# Patient Record
Sex: FEMALE | Race: WHITE | Employment: FULL TIME | URBAN - METROPOLITAN AREA
[De-identification: names, ages, dates, MRNs, and addresses within clinical notes are randomized per-mention and may not be internally consistent; named-entity substitution may affect disease eponyms.]

---

## 2018-12-26 ENCOUNTER — APPOINTMENT (OUTPATIENT)
Dept: PHYSICAL THERAPY | Age: 48
End: 2018-12-26

## 2019-01-10 ENCOUNTER — HOSPITAL ENCOUNTER (OUTPATIENT)
Dept: PHYSICAL THERAPY | Age: 49
Discharge: HOME | End: 2019-01-10
Payer: COMMERCIAL

## 2019-01-10 PROCEDURE — 97161 PT EVAL LOW COMPLEX 20 MIN: CPT | Performed by: PHYSICAL THERAPIST

## 2019-01-11 NOTE — PROGRESS NOTES
Laure Cardenas PHYSICAL THERAPY - DAILY TREATMENT NOTE    Patient Name: Yayo Andre        Date: 2019  : 1970   yes Patient  Verified  Visit #:   1     Insurance: Payor: Samantha Cowan / Plan: [address] PT / Product Type: Commerical /      In time: 600 Out time: 640   Total Treatment Time: 40     Medicare/[facility] Time Tracking (below)   Total Timed Codes (min):  na 1:1 Treatment Time:  na     TREATMENT AREA =  Lumbar spine pain [M54.5]    SUBJECTIVE  Pain Level (on 0 to 10 scale):  3  / 10   Medication Changes/New allergies or changes in medical history, any new surgeries or procedures?    no  If yes, update Summary List   Subjective Functional Status/Changes:  []  No changes reported     See ie          OBJECTIVE        Billed With/As:   [] TE   [] TA   [] Neuro   [] Self Care Patient Education: [x] Review HEP    [] Progressed/Changed HEP based on:   [x] positioning   [x] body mechanics   [x] transfers   [x] heat/ice application    [] other:      Other Objective/Functional Measures:    Reviewed doni exercise progression including standing for ease of completion at work, given sitting/sleeping position education as well as transfer mechanics. Demo understanding to all   See ie     Post Treatment Pain Level (on 0 to 10) scale:   3  / 10     ASSESSMENT  Assessment/Changes in Function:     See ie     []  See Progress Note/Recertification   Patient will continue to benefit from skilled PT services to modify and progress therapeutic interventions, address functional mobility deficits, address ROM deficits, address strength deficits, analyze and address soft tissue restrictions, analyze and cue movement patterns, analyze and modify body mechanics/ergonomics, assess and modify postural abnormalities and instruct in home and community integration to attain remaining goals.    Progress toward goals / Updated goals:    See ie     PLAN  []  Upgrade activities as tolerated yes Continue plan of care   [] Discharge due to :    []  Other:      Therapist: Raghu Brock PT    Date: 1/10/19 Time: 7:40 AM     Future Appointments   Date Time Provider Geraldine Burrell   1/15/2019  5:00 PM Todd Holland, PT [facility]   1/17/2019  2:00 PM [facility]   1/18/2019 10:30 AM Horace Joy, PT [facility]   1/21/2019  8:00 AM Todd Holland, PT [facility]   1/24/2019  4:30 PM [facility]   1/29/2019 11:00 AM Todd Holland, PT [facility]   1/30/2019  9:30 AM Todd Holland, PT [facility]   2/5/2019  5:00 PM Todd Holland, PT [facility]   2/7/2019  9:00 AM Hallie Woodson, PT [facility]

## 2019-01-11 NOTE — PROGRESS NOTES
.[address] THERAPY  [address] [place] [address] 51, Ravindra Hanh [address],[facility], [facility] - Phone: [phone]  Fax: [phone] OF CARE / [facility]  Patient Name: Nhan Ambrosio : 1970   Medical   Diagnosis: LBP Treatment Diagnosis: Lumbar spine pain [M54.5]   Onset Date: 2018     Referral Source: Luisa Saucedo DO Start of Care [facility]): 2019   Prior Hospitalization: See medical history Provider #: [id number]   Prior Level of Function: full   Comorbidities: none   Medications: Verified on Patient Summary List   The Plan of Care and following information is based on the information from the initial evaluation.   ===========================================================================================  Assessment / key information:  57 F arrives to clinic with c/o low back pain radiating to L buttock and lateral thigh. Reports original onset in 2018 with no DOMENICO and intermittent that has progressed to constant sx. 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 Previous treatment has including prednisone, LEISA, one PT and chiropractic treatment, Lidocaine patch and tens unit. At max provided 20% relief. 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 Rates pain 9/10 at worst with prolonged sitting and bending and 3/10 in prone position. Objective findings: postural assessment demo R trunk SB, L ant innominate and anterior ilial translation, l/s arom limited flex 75% with minimal curve reversal P!, L rotation 90% loss with t/s compensation and buttock pain, B sb 50%, et 25% with REIL and VLADIMIR reducing sx, +SLR on L as well as SI Cluster testing on L. Reduced l3 myotome on L. Poor glute max/med strength. ttp and increase tightness throughout paraspinals, L gm/pir and psoas, R RF/TFL.  Will attempt PT including doni approach (based on pt response) in order to address problem list below  ===========================================================================================  Eval Complexity: History MEDIUM  Complexity : 1-2 comorbidities / personal factors will impact the outcome/ POC ;  Examination  HIGH Complexity : 4+ Standardized tests and measures addressing body structure, function, activity limitation and / or participation in recreation ; Presentation LOW Complexity : Stable, uncomplicated ;  Decision Making MEDIUM Complexity : FOTO score of 26-74; Overall Complexity LOW   Problem List: pain affecting function, decrease ROM, decrease strength, impaired gait/ balance, decrease ADL/ functional abilitiies, decrease activity tolerance, decrease flexibility/ joint mobility and decrease transfer abilities   Treatment Plan may include any combination of the following: Therapeutic exercise, Therapeutic activities, Neuromuscular re-education, Physical agent/modality, Gait/balance training, Manual therapy, Patient education, Self Care training and Functional mobility training  Patient / Family readiness to learn indicated by: asking questions, trying to perform skills and interest  Persons(s) to be included in education: patient (P)  Barriers to Learning/Limitations: None  Measures taken, if barriers to learning:    Patient Goal (s): Decrease pain, return to prior level of function   Patient self reported health status: good  Rehabilitation Potential: good   Short Term Goals: To be accomplished in  2  weeks:  1. Pt will be compliant with hep  2. Pt will demonstrate ability to sit<>stand with appropriate hip hinge to improve lumbar stabilization during transfers  3. Pt will note daily max pain </=7/10 in order to increase participation in [address]: To be accomplished in  4  weeks:  1. Pt will increase LS arom within 25% in all direction to A with dressing  2. Pt will note daily max pain </=4/10 in order to increase participation in adls  3.  Pt will increase FOTO by 31 points in order to show functional improvement  Frequency / Duration:   Patient to be seen  2-3  times per week for 4  weeks:  Patient / Caregiver education and instruction: self care, activity modification and exercises  Therapist Signature: Panda Prince DPT [facility] Date: 7/71/1631   Certification Period: na Time: 7:43 AM   ===========================================================================================  I certify that the above Physical Therapy Services are being furnished while the patient is under my care. I agree with the treatment plan and certify that this therapy is necessary. Physician Signature:        Date:       Time:     Please sign and return to [facility]. or you may fax the signed copy to [phone]. Thank you.

## 2019-01-15 ENCOUNTER — HOSPITAL ENCOUNTER (OUTPATIENT)
Dept: PHYSICAL THERAPY | Age: 49
Discharge: HOME | End: 2019-01-15
Payer: COMMERCIAL

## 2019-01-15 PROCEDURE — 97140 MANUAL THERAPY 1/> REGIONS: CPT | Performed by: PHYSICAL THERAPIST

## 2019-01-15 PROCEDURE — 97110 THERAPEUTIC EXERCISES: CPT | Performed by: PHYSICAL THERAPIST

## 2019-01-15 NOTE — PROGRESS NOTES
Radha Long PHYSICAL THERAPY - DAILY TREATMENT NOTE    Patient Name: Sharonda Lorenzo        Date: 1/15/2019  : 1970   yes Patient  Verified  Visit #:   2     Insurance: Payor: Woody Rather / Plan: [address] / Product Type: Commerical /      In time: 450 Out time: 548   Total Treatment Time: 55     Medicare/[employer] Time Tracking (below)   Total Timed Codes (min):  na 1:1 Treatment Time:  na     TREATMENT AREA =  Lumbar spine pain [M54.5]    SUBJECTIVE  Pain Level (on 0 to 10 scale):  5  / 10   Medication Changes/New allergies or changes in medical history, any new surgeries or procedures?    no  If yes, update Summary List   Subjective Functional Status/Changes:  []  No changes reported     I have been doing the exercises as much as possible.  I thought it was getting a little better but then it just goes back out again           OBJECTIVE  Modalities Rationale:     decrease inflammation, decrease pain and increase tissue extensibility to improve patient's ability to complete adls   min [] Estim, type/location:                                      []  att     []  unatt     []  w/US     []  w/ice    []  w/heat    min []  Mechanical Traction: type/lbs                   []  pro   []  sup   []  int   []  cont    []  before manual    []  after manual    min []  Ultrasound, settings/location:      min []  Iontophoresis w/ dexamethasone, location:                                               []  take home patch       []  in clinic   10 min [x]  Ice     []  Heat    location/position:     min []  Vasopneumatic Device, press/temp:     min []  Other:    [x] Skin assessment post-treatment (if applicable):    []  intact    [x]  redness- no adverse reaction     []redness - adverse reaction:        20 min Therapeutic Exercise:  [x]  See flow sheet   Rationale:      increase ROM and increase strength to improve the patients ability to complete adls     25 min Manual Therapy: Met for L on L, angelina tech, [facility] ls paraspinals, t/s paraspinals, L psoas release   Rationale:      decrease pain, increase ROM, increase tissue extensibility and decrease trigger points to improve patient's ability to complete adls          Billed With/As:   [x] TE   [] TA   [] Neuro   [] Self Care Patient Education: [x] Review HEP    [] Progressed/Changed HEP based on:   [x] positioning   [x] body mechanics   [x] transfers   [x] heat/ice application    [] other:      Other Objective/Functional Measures:    Significant tenderness throughout L ls and R ts paraspinals, L psoas with reduced pa mobility throughout  Unable to lay on L side to perform sidelying exercises due to pain     Post Treatment Pain Level (on 0 to 10) scale:   3  / 10     ASSESSMENT  Assessment/Changes in Function:     Noted reduction in pain following session but pt was advised on DOMS      []  See Progress Note/Recertification   Patient will continue to benefit from skilled PT services to modify and progress therapeutic interventions, address functional mobility deficits, address ROM deficits, address strength deficits, analyze and address soft tissue restrictions, analyze and cue movement patterns, analyze and modify body mechanics/ergonomics, assess and modify postural abnormalities and instruct in home and community integration to attain remaining goals.    Progress toward goals / Updated goals:    Compliant with hep     PLAN  []  Upgrade activities as tolerated yes Continue plan of care   []  Discharge due to :    []  Other:      Therapist: Beth Yanes PT    Date: 1/15/2019 Time: 10:17 AM     Future Appointments   Date Time Provider Geraldine Burrell   1/15/2019  5:00 PM Adrienne Shaffer PT [facility]   [facility]17[facility]   1/18/2019 10:30 AM Javier Hernandez PT [facility]   1/21/2019  8:00 AM Adrienne Shaffer, PT [facility]   1/24/2019  4:30 [facility]   1/29/2019 11:00 AM Adrienne Shaffer PT [facility]   1/30/2019 9:30 AM Ernestine Garcia [facility]   2/5/2019  5:00 PM [facility]   2/7/2019  9:00 AM Marcelo Woodson, PT [facility]

## 2019-01-17 ENCOUNTER — HOSPITAL ENCOUNTER (OUTPATIENT)
Dept: PHYSICAL THERAPY | Age: 49
Discharge: HOME | End: 2019-01-17
Payer: COMMERCIAL

## 2019-01-17 PROCEDURE — 97110 THERAPEUTIC EXERCISES: CPT | Performed by: PHYSICAL THERAPIST

## 2019-01-17 PROCEDURE — 97140 MANUAL THERAPY 1/> REGIONS: CPT | Performed by: PHYSICAL THERAPIST

## 2019-01-17 NOTE — PROGRESS NOTES
Beatrice Brenner PHYSICAL THERAPY - DAILY TREATMENT NOTE    Patient Name: Christine Franklin        Date: 2019  : 1970   yes Patient  Verified  Visit #:   3     Insurance: Payor: Reyes Daunt / Plan: [address] PT / Product Type: Commerical /      In time: 211 Out time: 320   Total Treatment Time: 65     Medicare/[facility] Time Tracking (below)   Total Timed Codes (min):  na 1:1 Treatment Time:  na     TREATMENT AREA =  Lumbar spine pain [M54.5]    SUBJECTIVE  Pain Level (on 0 to 10 scale):  5  / 10   Medication Changes/New allergies or changes in medical history, any new surgeries or procedures?    no  If yes, update Summary List   Subjective Functional Status/Changes:  []  No changes reported     I was really sore that night but yesterday I woke up and felt pretty good.  I worked a 16 hour shift so I did the backbend as much as I could          OBJECTIVE  Modalities Rationale:     decrease inflammation, decrease pain and increase tissue extensibility to improve patient's ability to complete adls   min [] Estim, type/location:                                      []  att     []  unatt     []  w/US     []  w/ice    []  w/heat    min []  Mechanical Traction: type/lbs                   []  pro   []  sup   []  int   []  cont    []  before manual    []  after manual    min []  Ultrasound, settings/location:      min []  Iontophoresis w/ dexamethasone, location:                                               []  take home patch       []  in clinic   10 min [x]  Ice     []  Heat    location/position:     min []  Vasopneumatic Device, press/temp:     min []  Other:    [x] Skin assessment post-treatment (if applicable):    []  intact    [x]  redness- no adverse reaction     []redness - adverse reaction:        20 min Therapeutic Exercise:  [x]  See flow sheet   Rationale:      increase ROM and increase strength to improve the patients ability to complete adls     35 min Manual Therapy: Met for L on L, shotgun tech, stm ls paraspinals, t/s paraspinals, L psoas and glute release, grade I-ii pa mobs l3-5, sacral rr/flex mobs    Rationale:      decrease pain, increase ROM, increase tissue extensibility and decrease trigger points to improve patient's ability to complete adls          Billed With/As:   [x] TE   [] TA   [] Neuro   [] Self Care Patient Education: [x] Review HEP    [] Progressed/Changed HEP based on:   [x] positioning   [x] body mechanics   [x] transfers   [x] heat/ice application    [] other:      Other Objective/Functional Measures:  Due to reduction of pain level and improved positional tolerance performed similar program from 2nd visit  Reduced mm restrictions along paraspinals and reproduced lateral hip c/o with gm release  Required increased time to perform listed te with good form so held on remaining due to time constraint     Post Treatment Pain Level (on 0 to 10) scale:   2/ 10     ASSESSMENT  Assessment/Changes in Function:   Continues with s/s consistent with posterior derangement followed by sij dysfunction       []  See Progress Note/Recertification   Patient will continue to benefit from skilled PT services to modify and progress therapeutic interventions, address functional mobility deficits, address ROM deficits, address strength deficits, analyze and address soft tissue restrictions, analyze and cue movement patterns, analyze and modify body mechanics/ergonomics, assess and modify postural abnormalities and instruct in home and community integration to attain remaining goals.    Progress toward goals / Updated goals:    Progress towards goal     PLAN  []  Upgrade activities as tolerated yes Continue plan of care   []  Discharge due to :    []  Other:      Therapist: Brionna Stevens, PT    Date: 1/17/2019 Time: 10:17 AM     Future Appointments   Date Time Provider Geraldine Burrell   1/17/2019  2:00 PM Manish Valera [facility]   1/18/2019 10:30 AM Chelly Lott, PT [facility] 1/21/2019  8:00 AM Julieta Maciel, PT [facility]   1/24/2019  4:30 PM Anca Castro [facility]   [facility]/29/2019 [facility]   1/30/2019  9:30 AM Julieta Maciel, PT [facility]   2/5/2019  5:00 PM Julieta Maciel, PT [facility]   2/7/2019  9:00 AM Julio Woodson, PT [facility]

## 2019-01-18 ENCOUNTER — HOSPITAL ENCOUNTER (OUTPATIENT)
Dept: PHYSICAL THERAPY | Age: 49
Discharge: HOME | End: 2019-01-18
Payer: COMMERCIAL

## 2019-01-18 PROCEDURE — 97110 THERAPEUTIC EXERCISES: CPT

## 2019-01-18 PROCEDURE — 97140 MANUAL THERAPY 1/> REGIONS: CPT

## 2019-01-18 NOTE — PROGRESS NOTES
PHYSICAL THERAPY - DAILY TREATMENT NOTE    Patient Name: Emilio Enriquez        Date: 2019  : 1970   yes Patient  Verified  Visit #:   4     Insurance: Payor: Yenni Lindsay / Plan: [address] PT / Product Type: Commerical /      In time: 1041 Out time: 1150   Total Treatment Time: 63     Medicare/[employer] Time Tracking (below)   Total Timed Codes (min):  na 1:1 Treatment Time:  na     TREATMENT AREA =  Lumbar spine pain [M54.5]    SUBJECTIVE  Pain Level (on 0 to 10 scale):  6  / 10   Medication Changes/New allergies or changes in medical history, any new surgeries or procedures?    no  If yes, update Summary List   Subjective Functional Status/Changes:  []  No changes reported     Patient reports her back pain is elevated today which she relates to leaning over a table helping her kids with a school project. Patient states the L side of her back feels stiff and she notices symptoms into her L hip. OBJECTIVE  Modalities Rationale:     decrease inflammation and decrease pain to improve patient's ability to perform transfers. min [] Estim, type/location:                                      []  att     []  unatt     []  w/US     []  w/ice    []  w/heat    min []  Mechanical Traction: type/lbs                   []  pro   []  sup   []  int   []  cont    []  before manual    []  after manual    min []  Ultrasound, settings/location:      min []  Iontophoresis w/ dexamethasone, location:                                               []  take home patch       []  in clinic   10 min [x]  Ice     []  Heat    location/position:  To lumbar spine in prone    min []  Vasopneumatic Device, press/temp:     min []  Other:    [x] Skin assessment post-treatment (if applicable):    [x]  intact    []  redness- no adverse reaction     []redness - adverse reaction:        28 min Therapeutic Exercise:  [x]  See flow sheet   Rationale:      increase ROM and increase strength to improve the patients ability to perform walking activities. 25 min Manual Therapy: STM to L QL, L lumbar paraspinals, L glute med; MET correction for L rotated innominate; sacral flexion mobs   Rationale:      decrease pain, increase ROM, increase tissue extensibility and decrease trigger points to improve patient's ability to perform work activities. Billed With/As:   [x] TE   [] TA   [] Neuro   [] Self Care Patient Education: [x] Review HEP    [] Progressed/Changed HEP based on:   [] positioning   [] body mechanics   [] transfers   [] heat/ice application    [] other:      Other Objective/Functional Measures:    Patient presenting with increased muscular tone and significant tenderness to L QL and lumbar paraspinals; palpation of L glute med reproduced patients symptoms to this area  Resumed SL hip abduction and SL clams this session for improved lateral hip strength     Post Treatment Pain Level (on 0 to 10) scale:   6 / 10     ASSESSMENT  Assessment/Changes in Function:     Patient denied changes in symptoms post session. Educated patient on postural awareness as well as focusing on extension exercises in order to better manage symptoms. []  See Progress Note/Recertification   Patient will continue to benefit from skilled PT services to modify and progress therapeutic interventions, address functional mobility deficits, address ROM deficits, address strength deficits, analyze and address soft tissue restrictions, analyze and cue movement patterns, analyze and modify body mechanics/ergonomics and assess and modify postural abnormalities to attain remaining goals. Progress toward goals / Updated goals:    No progress with LTG#2 this session due to elevated pain levels.       PLAN  [x]  Upgrade activities as tolerated yes Continue plan of care   []  Discharge due to :    []  Other:      Therapist: Rogelio Lama PT    Date: 1/18/2019 Time: 12:35 PM     Future Appointments   Date Time Provider Geraldine Burrell 1/21/2019  8:00 AM Ether Mix, PT [facility]   1/24/2019  4:30 PM Otto [facility]   1/29/2019 11:00 AM Otto [facility]   1/30/2019  9:30 AM Ether Mix, PT [facility]   2/5/2019  5:00 PM Ether Mix, PT [facility]   2/7/2019  9:00 AM Katey Woodson, PT [facility]

## 2019-01-21 ENCOUNTER — HOSPITAL ENCOUNTER (OUTPATIENT)
Dept: PHYSICAL THERAPY | Age: 49
Discharge: HOME | End: 2019-01-21
Payer: COMMERCIAL

## 2019-01-21 PROCEDURE — 97140 MANUAL THERAPY 1/> REGIONS: CPT | Performed by: PHYSICAL THERAPIST

## 2019-01-21 NOTE — PROGRESS NOTES
Humberto Lackey PHYSICAL THERAPY - DAILY TREATMENT NOTE    Patient Name: Orville Harper        Date: 2019  : 1970   yes Patient  Verified  Visit #:     Insurance: Payor: Samella Oats / Plan: [address] PT / Product Type: Commerical /      In time: 984 Out time: 724   Total Treatment Time: 35     Medicare/[facility] Time Tracking (below)   Total Timed Codes (min):  na 1:1 Treatment Time:  na     TREATMENT AREA =  Lumbar spine pain [M54.5]    SUBJECTIVE  Pain Level (on 0 to 10 scale):  6  / 10   Medication Changes/New allergies or changes in medical history, any new surgeries or procedures?    no  If yes, update Summary List   Subjective Functional Status/Changes:  []  No changes reported     I just got off two 14+ hour shifts and I am really struggling          OBJECTIVE      nc min Therapeutic Exercise:  [x]  See flow sheet   Rationale:      increase ROM and increase strength to improve the patients ability to complete adls      30 min Manual Therapy: L psoas, gm, piri release, R sacral rotation/flex dtm ls paraspinals, l/s lamina release grade I-ii pa mobs l3-5   Rationale:      decrease pain, increase ROM, increase tissue extensibility and decrease trigger points to improve patient's ability to complete adls    safely ambulate at home for self care.       Billed With/As:   [x] MT   [] TA   [] Neuro   [] Self Care Patient Education: [x] Review HEP    [] Progressed/Changed HEP based on:   [x] positioning   [x] body mechanics   [] transfers   [] heat/ice application    [] other:      Other Objective/Functional Measures:    Due pt time constraint pt unable to complete entire program  Continues with L paraspinal, ql/gm/piri tightness with sij dysfunction     Post Treatment Pain Level (on 0 to 10) scale:   4  / 10     ASSESSMENT  Assessment/Changes in Function:     Continues to have pain with forward bending and attempts relief with repeated ext but do to pt scheduling unable to perform on a consistent basis. []  See Progress Note/Recertification   Patient will continue to benefit from skilled PT services to modify and progress therapeutic interventions, address functional mobility deficits, address ROM deficits, address strength deficits, analyze and address soft tissue restrictions, analyze and cue movement patterns, analyze and modify body mechanics/ergonomics, assess and modify postural abnormalities and instruct in home and community integration to attain remaining goals. Progress toward goals / Updated goals:     Will perform functional scale next session in order to assess progress towards goal      PLAN  []  Upgrade activities as tolerated yes Continue plan of care   []  Discharge due to :    []  Other:      Therapist: Kimberly Aponte PT    Date: 1/21/2019 Time: 9:35 AM     Future Appointments   Date Time Provider Geraldine Burrell   1/24/2019  4:30 PM Qi Lees [facility]   1/29/2019 11:00 AM Louise Page, PT [facility]   1/30/2019  9:30 AM Louise Page, PT [facility]   2/5/2019  5:00 PM Louise Page, PT [facility]   2/7/2019  9:00 AM June Woodson, PT [facility]

## 2019-01-24 ENCOUNTER — HOSPITAL ENCOUNTER (OUTPATIENT)
Dept: PHYSICAL THERAPY | Age: 49
Discharge: HOME | End: 2019-01-24
Payer: COMMERCIAL

## 2019-01-24 PROCEDURE — 97110 THERAPEUTIC EXERCISES: CPT | Performed by: PHYSICAL THERAPIST

## 2019-01-24 PROCEDURE — 97140 MANUAL THERAPY 1/> REGIONS: CPT | Performed by: PHYSICAL THERAPIST

## 2019-01-24 NOTE — PROGRESS NOTES
[facility] PHYSICAL THERAP[facility] - DAILY TREATMENT NOTE    Patient Name: Colby Gresham        Date: 2019  : 1970   yes Patient  Verified  Visit #:     Insurance: Payor: Arina Macedo / Plan: [address] PT / Product Type: Commerical /      In time: 440 Out time: 530   Total Treatment Time: 50     Medicare/[employer] Time Tracking (below)   Total Timed Codes (min):  na 1:1 Treatment Time:  na     TREATMENT AREA =  Lumbar spine pain [M54.5]    SUBJECTIVE  Pain Level (on 0 to 10 scale):  5  / 10   Medication Changes/New allergies or changes in medical history, any new surgeries or procedures?    no  If yes, update Summary List   Subjective Functional Status/Changes:  []  No changes reported     I had the injection on Monday and I am no better no worse.  I did not have a chance to do my exercises yesterday because of my work schedule        OBJECTIVE  Modalities Rationale:     decrease inflammation, decrease pain and increase tissue extensibility to improve patient's ability to complete adls                min [] Estim, type/location:                                                            []  att     []  unatt     []  w/US     []  w/ice    []  w/heat     min []  Mechanical Traction: type/lbs                    []  pro   []  sup   []  int   []  cont    []  before manual    []  after manual     min []  Ultrasound, settings/location:        min []  Iontophoresis w/ dexamethasone, location:                                                []  take home patch       []  in clinic   10 min [x]  Ice     []  Heat    location/position:       min []  Vasopneumatic Device, press/temp:       min []  Other:     [x] Skin assessment post-treatment (if applicable):    []  intact    [x]  redness- no adverse reaction     []redness - adverse reaction:            20 min Therapeutic Exercise:  [x]  See flow sheet   Rationale:      increase ROM and increase strength to improve the patients ability to complete adls      20 min Manual Therapy: L psoas, gm, piri release, R sacral rotation/flex dtm ls paraspinals, l/s lamina release grade I-iii pa mobs l3-5   Rationale:      decrease pain, increase ROM, increase tissue extensibility and decrease trigger points to improve patient's ability to complete adls    safely ambulate at home for self care. Billed With/As:   [x] MT   [] TA   [] Neuro   [] Self Care Patient Education: [x] Review HEP    [] Progressed/Changed HEP based on:   [x] positioning   [x] body mechanics   [] transfers   [] heat/ice application    [] other:      Other Objective/Functional Measures:  Increased MT as per flow sheet to improve l/s extension mobility   ttp along l4/5 sp but reduced mm guarding noted along L paraspinals     Post Treatment Pain Level (on 0 to 10) scale:   4  / 10     ASSESSMENT  Assessment/Changes in Function:     Pt advised on doms due to increase in mt    []  See Progress Note/Recertification   Patient will continue to benefit from skilled PT services to modify and progress therapeutic interventions, address functional mobility deficits, address ROM deficits, address strength deficits, analyze and address soft tissue restrictions, analyze and cue movement patterns, analyze and modify body mechanics/ergonomics, assess and modify postural abnormalities and instruct in home and community integration to attain remaining goals.    Progress toward goals / Updated goals:    Slow progress with pain reduction      PLAN  []  Upgrade activities as tolerated yes Continue plan of care   []  Discharge due to :    []  Other:      Therapist: Trev Fernandez PT    Date: 1/24/2019 Time: 9:35 AM     Future Appointments   Date Time Provider Geraldine Burrell   1/24/2019  4:30 PM Heidi Redmond [facility]   1/29/2019 11:00 AM Eddie Adkins PT [facility]   1/30/2019  9:30 AM Eddie Adkins PT [facility]   2/5/2019  5:00 PM Eddie Adkins PT [facility]   2/7/2019  9:00 AM Shlomo Woodson, PT [facility]

## 2019-01-29 ENCOUNTER — HOSPITAL ENCOUNTER (OUTPATIENT)
Dept: PHYSICAL THERAPY | Age: 49
Discharge: HOME | End: 2019-01-29
Payer: COMMERCIAL

## 2019-01-29 PROCEDURE — 97110 THERAPEUTIC EXERCISES: CPT | Performed by: PHYSICAL THERAPIST

## 2019-01-29 PROCEDURE — 97140 MANUAL THERAPY 1/> REGIONS: CPT | Performed by: PHYSICAL THERAPIST

## 2019-01-29 NOTE — PROGRESS NOTES
[facility] PHYSICAL [facility]ERAPY - DAILY TREATMENT NOTE    Patient Name: Christine Franklin        Date: 2019  : 1970   yes Patient  Verified  Visit #:     Insurance: Payor: Reyes Daunt / Plan: [address] / Product Type: Commerical /      In time: 1110 Out time: 1210   Total Treatment Time: 60     Medicare/[facility] Time Tracking (below)   Total Timed Codes (min):  na 1:1 Treatment Time:  na     TREATMENT AREA =  Lumbar spine pain [M54.5]    SUBJECTIVE  Pain Level (on 0 to 10 scale):  3  / 10   Medication Changes/New allergies or changes in medical history, any new surgeries or procedures?    no  If yes, update Summary List   Subjective Functional Status/Changes:  []  No changes reported     I think the injections really helped my back because the pain has gone gown.         OBJECTIVE  Modalities Rationale:     decrease inflammation, decrease pain and increase tissue extensibility to improve patient's ability to complete adls                min [] Estim, type/location:                                                            []  att     []  unatt     []  w/US     []  w/ice    []  w/heat     min []  Mechanical Traction: type/lbs                    []  pro   []  sup   []  int   []  cont    []  before manual    []  after manual     min []  Ultrasound, settings/location:        min []  Iontophoresis w/ dexamethasone, location:                                                []  take home patch       []  in clinic   10 min [x]  Ice     []  Heat    location/position:       min []  Vasopneumatic Device, press/temp:       min []  Other:     [x] Skin assessment post-treatment (if applicable):    []  intact    [x]  redness- no adverse reaction     []redness - adverse reaction:            30 min Therapeutic Exercise:  [x]  See flow sheet   Rationale:      increase ROM and increase strength to improve the patients ability to complete adls      20 min Manual Therapy: L  gm, piri release, R sacral rotation/flex dtm ls paraspinals, l/s lamina release grade I-iii pa mobs l3-5   Rationale:      decrease pain, increase ROM, increase tissue extensibility and decrease trigger points to improve patient's ability to complete adls    safely ambulate at home for self care. Billed With/As:   [x] MT   [] TA   [] Neuro   [] Self Care Patient Education: [x] Review HEP    [] Progressed/Changed HEP based on:   [x] positioning   [x] body mechanics   [] transfers   [] heat/ice application    [] other:      Other Objective/Functional Measures:  Increased lumbar arom ext without c/o pain or radicular sx, able to lay on L side for first time since start of therapy in order to perform exercises  Continues with tenderness along L glute and piri      Post Treatment Pain Level (on 0 to 10) scale:   2  / 10     ASSESSMENT  Assessment/Changes in Function:   Continuing to improve pain levels -sitting still exacerbates sx      []  See Progress Note/Recertification   Patient will continue to benefit from skilled PT services to modify and progress therapeutic interventions, address functional mobility deficits, address ROM deficits, address strength deficits, analyze and address soft tissue restrictions, analyze and cue movement patterns, analyze and modify body mechanics/ergonomics, assess and modify postural abnormalities and instruct in home and community integration to attain remaining goals.    Progress toward goals / Updated goals:    Steady progress with pain reduction goal      PLAN  []  Upgrade activities as tolerated yes Continue plan of care   []  Discharge due to :    []  Other:      Therapist: Marine Wharton PT    Date: 1/29/2019 Time: 9:35 AM     Future Appointments   Date Time Provider Geraldine Burrell   1/29/2019 11:00 AM Karthik Barrera PT [facility]   1/30/2019  9:30 AM Karthik Barrera PT [facility]   2/5/2019  5:00 PM Robson Lydia [facility]   2/7/2019 12:00 PM Karthik Barrera PT [facility]   2/20/2019 8:00 AM Art Breaux [facility]   2/22/2019  8:30 AM Adrienne Shaffer, PT [facility]   2/25/2019  8:30 AM Adrienne Shaffer, PT [facility]   2/28/2019  6:00 PM Javier Hernandez, PT [facility]

## 2019-01-30 ENCOUNTER — HOSPITAL ENCOUNTER (OUTPATIENT)
Dept: PHYSICAL THERAPY | Age: 49
Discharge: HOME | End: 2019-01-30
Payer: COMMERCIAL

## 2019-01-30 PROCEDURE — 97140 MANUAL THERAPY 1/> REGIONS: CPT | Performed by: PHYSICAL THERAPIST

## 2019-01-30 PROCEDURE — 97110 THERAPEUTIC EXERCISES: CPT | Performed by: PHYSICAL THERAPIST

## 2019-01-30 NOTE — PROGRESS NOTES
[facility] PHYSICAL THERAPY - DAILY TREATMENT NOTE    Patient Name: Lori Peralta        Date: 2019  : 1970   yes Patient  Verified  Visit #:     Insurance: Payor: Sweetie Pulliam / Plan: [address] / Product Type: Commerical /      In time: 283 Out time: 2173   Total Treatment Time: 70     Medicare/[facility] Time Tracking (below)   Total Timed Codes (min):  na 1:1 Treatment Time:  na     TREATMENT AREA =  Lumbar spine pain [M54.5]    SUBJECTIVE  Pain Level (on 0 to 10 scale):  2  10   Medication Changes/New allergies or changes in medical history, any new surgeries or procedures?    no  If yes, update Summary List   Subjective Functional Status/Changes:  []  No changes reported   I am actually not in that much pain unless I sit too long          OBJECTIVE      50 min Therapeutic Exercise:  [x]  See flow sheet   Rationale:      increase ROM and increase strength to improve the patients ability to complete adls      20 min Manual Therapy: L  gm, piri and psoas release, R sacral rotation/flex dtm ls paraspinals, grade I-iii pa mobs l3-5, sacral flexion mobs    Rationale:      decrease pain, increase ROM, increase tissue extensibility and decrease trigger points to improve patient's ability to complete adls    safely ambulate at home for self care.       Billed With/As:   [x] MT   [] TA   [] Neuro   [] Self Care Patient Education: [x] Review HEP    [] Progressed/Changed HEP based on:   [x] positioning   [x] body mechanics   [] transfers   [] heat/ice application    [] other:      Other Objective/Functional Measures:  Able to perform full press ups without c/o pain or \"pinching\" sensation previously reported  Added in multiple te to improve stability in order to be able to sustain prolonged positions      Post Treatment Pain Level (on 0 to 10) scale:   2   10     ASSESSMENT  Assessment/Changes in Function:     Chief c/o is inability to sit prolonged periods - discussed modifications including lumbar support and 90/90 positioning but denies change with this. Continues to report what appears to be mm weakness contributing to flex posture    []  See Progress Note/Recertification   Patient will continue to benefit from skilled PT services to modify and progress therapeutic interventions, address functional mobility deficits, address ROM deficits, address strength deficits, analyze and address soft tissue restrictions, analyze and cue movement patterns, analyze and modify body mechanics/ergonomics, assess and modify postural abnormalities and instruct in home and community integration to attain remaining goals.    Progress toward goals / Updated goals:  LTG #1 achieved this session - will assess carry over next appointment      PLAN  []  Upgrade activities as tolerated yes Continue plan of care   []  Discharge due to :    []  Other:      Therapist: Ricco Singer PT    Date: 1/30/2019 Time: 9:35 AM     Future Appointments   Date Time Provider Geraldine Burrell   1/30/2019  9:30 AM Kash Dee PT [facility]   2/5/2019  5:00 PM Sheila Valenzuela [facility]   2/7/2019 12:00 PM Sheila Valenzuela [facility]   2/20/2019  8:00 AM Kash Dee PT [facility]   2/22/2019  8:30 AM Kash Dee PT [facility]   2/25/2019  8:30 AM Kash Dee PT [facility]   2/28/2019  6:00 PM Guerline Bacon PT [facility]

## 2019-02-05 ENCOUNTER — HOSPITAL ENCOUNTER (OUTPATIENT)
Dept: PHYSICAL THERAPY | Age: 49
Discharge: HOME | End: 2019-02-05
Payer: COMMERCIAL

## 2019-02-05 PROCEDURE — 97110 THERAPEUTIC EXERCISES: CPT | Performed by: PHYSICAL THERAPIST

## 2019-02-05 PROCEDURE — 97140 MANUAL THERAPY 1/> REGIONS: CPT | Performed by: PHYSICAL THERAPIST

## 2019-02-05 NOTE — PROGRESS NOTES
[facility] PHYSICAL THERAPY - DAILY TREATMENT NOTE    Patient Name: Kelli Castellano        Date: 2019  : 1970   yes Patient  Verified  Visit #:     Insurance: Payor: King Sable / Plan: [address] / Product Type: Commerical /      In time: 500 Out time: 602   Total Treatment Time: 60     Medicare/[facility] Time Tracking (below)   Total Timed Codes (min):  na 1:1 Treatment Time:  na     TREATMENT AREA =  Lumbar spine pain [M54.5]    SUBJECTIVE  Pain Level (on 0 to 10 scale):  2 / 10   Medication Changes/New allergies or changes in medical history, any new surgeries or procedures?    no  If yes, update Summary List   Subjective Functional Status/Changes:  []  No changes reported   I am actually not in that much pain unless I sit too long          OBJECTIVE      40 min Therapeutic Exercise:  [x]  See flow sheet   Rationale:      increase ROM and increase strength to improve the patients ability to complete adls      20 min Manual Therapy: L  gm, piri and psoas release, R sacral rotation/flex dtm ls paraspinals, grade I-iii pa mobs l3-5, sacral flexion mobs    Rationale:      decrease pain, increase ROM, increase tissue extensibility and decrease trigger points to improve patient's ability to complete adls    safely ambulate at home for self care.       Billed With/As:   [x] MT   [] TA   [] Neuro   [] Self Care Patient Education: [x] Review HEP    [] Progressed/Changed HEP based on:   [x] positioning   [x] body mechanics   [] transfers   [] heat/ice application    [] other:      Other Objective/Functional Measures:  Reduction in ls and glute mm tone compared to previous session, symmetrical sij, ttp with pa pressure l3-5      Post Treatment Pain Level (on 0 to 10) scale:   1  / 10     ASSESSMENT  Assessment/Changes in Function:   Reports inability to sit >1 hour (I.e, flight) without progressive low back pain requiring constant positional change - indicates need for improved stability    []  See Progress Note/Recertification   Patient will continue to benefit from skilled PT services to modify and progress therapeutic interventions, address functional mobility deficits, address ROM deficits, address strength deficits, analyze and address soft tissue restrictions, analyze and cue movement patterns, analyze and modify body mechanics/ergonomics, assess and modify postural abnormalities and instruct in home and community integration to attain remaining goals.    Progress toward goals / Updated goals:  Consistent progress towards pain reduction      PLAN  []  Upgrade activities as tolerated yes Continue plan of care   []  Discharge due to :    []  Other:      Therapist: Jaqueline Lawson PT    Date: 2/5/2019 Time: 9:35 AM     Future Appointments   Date Time Provider Geraldine Burrell   2/5/2019  5:00 PM [facility]   2/7/2019 12:00 PM [facility]   2/20/2019  8:00 AM Santiago Lewis, PT [facility]   2/22/2019  8:30 AM Santiago Lewis, PT [facility]   2/25/2019  8:30 AM Santiago Lewis, PT [facility]   2/28/2019  6:00 PM Zachariah Scott, PT [facility]

## 2019-02-07 ENCOUNTER — HOSPITAL ENCOUNTER (OUTPATIENT)
Dept: PHYSICAL THERAPY | Age: 49
Discharge: HOME | End: 2019-02-07
Payer: COMMERCIAL

## 2019-02-07 PROCEDURE — 97140 MANUAL THERAPY 1/> REGIONS: CPT | Performed by: PHYSICAL THERAPIST

## 2019-02-07 NOTE — PROGRESS NOTES
.[address],[facility], [address] [facility] - Phone: [phone]  Fax: [phone]  PROGRESS NOTE  Patient Name: Nhan Ambrosio : 1970   Treatment/Medical Diagnosis: Lumbar spine pain [M54.5]   Referral Source: Luisa Saucedo DO     Date of Initial Visit: 1/10/19 Attended Visits: 10 Missed Visits: 0     SUMMARY OF TREATMENT  Pt has been treated at this facility 2-3x/week for a total of 10 visits with treatment consisting of directional based exercises for rom and core stability, manual therapy (prom/mobs/dtm) and modalities prn. In addition pt was instructed on hep  CURRENT STATUS  Pt has made good progress with PT thus far. Currently rates pain 1-5/10 with exacerbation during prolonged sitting (> 1 hour) or repetitive bending. She denies any sx distal to buttock and has maintained symmetrical sij > 1 week. Continues with reduced glute med/max, transverse abdominal and multifidi strength. Would like to continue with therapy to address this and re-introduce flexion based te     Goal/Measure of Progress Goal Met? 1. Pt will increase LS arom within 25% in all directions to A with dressing   Status at last Eval: Flex 75%, l rot 90%, B sb 50%, ext 25% Current Status: Flex 50%, l rot 60%, L sb 25%, ext 25% progressing   2. Pt will note daily max pain </=4/10 in order to increase participation in adls   Status at last Eval: 9/10 Current Status: 5/10 progressing   3. Pt will increase FOTO by 31 points in order to show functional improvement   Status at last Eval:  Current Status: Pt did not fill out n/a     New Goals to be achieved in __4__  weeks:  Continue as above   RECOMMENDATIONS  Continue therapy an additional 2x/4 weeks  If you have any questions/comments please contact us directly at [phone]. Thank you for allowing us to assist in the care of your patient.     Therapist Signature: Emilio Stewart DPT, [facility]  Date: 2/7/2019     Time: 11:09 AM   NOTE TO PHYSICIAN:  PLEASE COMPLETE THE ORDERS BELOW AND FAX TO   [facility] Physical [facility]: [phone]  If you are unable to process this request in 24 hours please contact our office: [phone]    ___ I have read the above report and request that my patient continue as recommended.   ___ I have read the above report and request that my patient continue therapy with the following changes/special instructions:_________________________________________________________   ___ I have read the above report and request that my patient be discharged from therapy.      Physician Signature:        Date:       Time:

## 2019-02-07 NOTE — PROGRESS NOTES
[facility] PHYSICAL THERAPY - DAILY TREATMENT NOTE    Patient Name: Maninder Benitez        Date: 2019  : 1970   yes Patient  Verified  Visit #:   10  of   12  Insurance: Payor: Cordelia Arango / Plan: [address] / Product Type: Commerical /      In time: 77 Out time: 100   Total Treatment Time: 42     Medicare/[facility] Time Tracking (below)   Total Timed Codes (min):  na 1:1 Treatment Time:  na     TREATMENT AREA =  Lumbar spine pain [M54.5]    SUBJECTIVE  Pain Level (on 0 to 10 scale):  4 / 10   Medication Changes/New allergies or changes in medical history, any new surgeries or procedures?    no  If yes, update Summary List   Subjective Functional Status/Changes:  []  No changes reported   See pn          OBJECTIVE      42 min Manual Therapy: L  gm, piri and psoas release, R sacral rotation/flex dtm ls paraspinals, grade I-iii pa mobs l3-5, sacral flexion mobs    Rationale:      decrease pain, increase ROM, increase tissue extensibility and decrease trigger points to improve patient's ability to complete adls    safely ambulate at home for self care.       Billed With/As:   [x] MT   [] TA   [] Neuro   [] Self Care Patient Education: [x] Review HEP    [] Progressed/Changed HEP based on:   [x] positioning   [x] body mechanics   [] transfers   [] heat/ice application    [] other:      Other Objective/Functional Measures:  See pn      Post Treatment Pain Level (on 0 to 10) scale:    10     ASSESSMENT  Assessment/Changes in Function:   See pn    []  See Progress Note/Recertification   Patient will continue to benefit from skilled PT services to modify and progress therapeutic interventions, address functional mobility deficits, address ROM deficits, address strength deficits, analyze and address soft tissue restrictions, analyze and cue movement patterns, analyze and modify body mechanics/ergonomics, assess and modify postural abnormalities and instruct in home and community integration to attain remaining goals.    Progress toward goals / Updated goals:  See pn      PLAN  []  Upgrade activities as tolerated yes Continue plan of care   []  Discharge due to :    []  Other:      Therapist: Lee Lopez PT    Date: 2/7/2019 Time: 9:35 AM     Future Appointments   Date Time Provider Geraldine Burrell   2/20/2019  8:00 AM Karoline Deng, PT [facility]   2/22/2019  8:30 AM Karoline Deng, PT [facility]   2/25/2019  8:30 AM Karoline Deng, PT [facility]   2/28/2019  6:00 PM Matty Pritchard, PT [facility]

## 2019-02-20 ENCOUNTER — HOSPITAL ENCOUNTER (OUTPATIENT)
Dept: PHYSICAL THERAPY | Age: 49
Discharge: HOME | End: 2019-02-20
Payer: COMMERCIAL

## 2019-02-20 PROCEDURE — 97140 MANUAL THERAPY 1/> REGIONS: CPT | Performed by: PHYSICAL THERAPIST

## 2019-02-20 PROCEDURE — 97110 THERAPEUTIC EXERCISES: CPT | Performed by: PHYSICAL THERAPIST

## 2019-02-20 NOTE — PROGRESS NOTES
Leonidas Him   PHYSICAL THERAPY - DAILY TREATMENT NOTE    Patient Name: Komal Correa        Date: 2019  : 1970   yes Patient  Verified  Visit #:   6  of   16  Insurance: Payor: Ton Gomes / Plan: [address] PT / Product Type: Commerical /      In time: 810 Out time: 920   Total Treatment Time: 70     Medicare/[facility] Time Tracking (below)   Total Timed Codes (min):  na 1:1 Treatment Time:  na     TREATMENT AREA =  Lumbar spine pain [M54.5]    SUBJECTIVE  Pain Level (on 0 to 10 scale):  1 / 10   Medication Changes/New allergies or changes in medical history, any new surgeries or procedures?    no  If yes, update Summary List   Subjective Functional Status/Changes:  []  No changes reported     The only thing that really increase in my pain is when I sit too long        OBJECTIVE  Modalities Rationale:     decrease inflammation, decrease pain and increase tissue extensibility to improve patient's ability to complete adls                          min [] Estim, type/location:                                      []  att     []  unatt     []  w/US     []  w/ice    []  w/heat     min []  Mechanical Traction: type/lbs                    []  pro   []  sup   []  int   []  cont    []  before manual    []  after manual     min []  Ultrasound, settings/location:        min []  Iontophoresis w/ dexamethasone, location:                                                []  take home patch       []  in clinic   10 min [x]  Ice     []  Heat    location/position:       min []  Vasopneumatic Device, press/temp:       min []  Other:     [x] Skin assessment post-treatment (if applicable):    []  intact    [x]  redness- no adverse reaction     []redness - adverse reaction:        40 min Therapeutic Exercise:  [x]  See flow sheet   Rationale:      increase ROM and increase strength to improve the patients ability to complete adls            20 min Manual Therapy: L  gm, piri release, R sacral rotation/flex dtm ls paraspinals, grade I-iii pa mobs l3-5, sacral flexion mobs    Rationale:      decrease pain, increase ROM, increase tissue extensibility and decrease trigger points to improve patient's ability to complete adls    safely ambulate at home for self care. Billed With/As:   [x] MT   [] TA   [] Neuro   [] Self Care Patient Education: [x] Review HEP    [] Progressed/Changed HEP based on:   [x] positioning   [x] body mechanics   [] transfers   [] heat/ice application    [] other:      Other Objective/Functional Measures:  sij symmetrical but reduced flexion and l rotation noted, l3 tp with audible cavitation noted during pa   ttp along L ls paraspinals   Continues with lumbar instability limiting prolonged positions    Post Treatment Pain Level (on 0 to 10) scale:   3  / 10     ASSESSMENT  Assessment/Changes in Function:   Reported increase in pain following session - attributed that to increase in exercises. Advised on doms    []  See Progress Note/Recertification   Patient will continue to benefit from skilled PT services to modify and progress therapeutic interventions, address functional mobility deficits, address ROM deficits, address strength deficits, analyze and address soft tissue restrictions, analyze and cue movement patterns, analyze and modify body mechanics/ergonomics, assess and modify postural abnormalities and instruct in home and community integration to attain remaining goals.    Progress toward goals / Updated goals:  FOTO increased 23 points - ltg achieved      PLAN  []  Upgrade activities as tolerated yes Continue plan of care   []  Discharge due to :    []  Other:      Therapist: Marissa De Leon PT    Date: 2/20/2019 Time: 9:35 AM     Future Appointments   Date Time Provider Geraldine Burrell   2/20/2019  8:00 AM Jese Joya PT [facility]   2/22/2019  8:30 AM Jese Joya PT [facility]   2/25/2019  8:30 AM Jese Joya PT [facility]   2/28/2019  6:00 PM Indio Chen PT [facility]

## 2019-02-22 ENCOUNTER — HOSPITAL ENCOUNTER (OUTPATIENT)
Dept: PHYSICAL THERAPY | Age: 49
Discharge: HOME | End: 2019-02-22
Payer: COMMERCIAL

## 2019-02-22 PROCEDURE — 97140 MANUAL THERAPY 1/> REGIONS: CPT | Performed by: PHYSICAL THERAPIST

## 2019-02-22 NOTE — PROGRESS NOTES
.ELI [address] [place] [address] THERAPY  [address] [place] [address] Shanita Mart [address],[place], [facility] - Phone: [phone]  Fax: [phone]  Request for use of Dry Needling/Intramuscular Manual Therapy  Patient Name: Tristan Gomez : 1970   Treatment/Medical Diagnosis: Lumbar spine pain [M54.5]   Referral Source: Melanie Winter DO     Date of Initial Visit: 1/10/19 Attended Visits: 12 Missed Visits: 0     Based on findings from the physical therapy examination and evaluation, the evaluating therapist believes the patient, Tristan Gomez would benefit from including Dry Needling as part of the plan of care. Dry needling is a treatment technique utilized in conjunction with other PT interventions to inactivate myofascial trigger points and the pain and dysfunction they cause. Dry Needling is an advanced procedure that requires additional training of intensive course work. PROCEDURE:          -  Solid filament sterile needle (typically 0.3mm/30 gauge) inserted into a trigger point          -  Repeated movements inactivate the trigger points, taking 30-60 seconds per site  Typically consists of 1 dry needling session per week and a possible second treatment including muscle re-education, flexibility, strengthening and other manual techniques to facilitate the benefits of dry needling  BENEFITS:   Inactivation of trigger points   Decreased pain   Increased muscle length   Improved movement patterns   Restoration of function POTENTIAL RISKS:   Post-needling soreness   Infection   Bruising/bleeding   Penetration of a nerve   Pneumothorax  All treating PTs have been thoroughly educated in avoiding adverse reactions   If you agree with this recommendation, please sign the attached prescription form and fax it to us at [phone].   If you have questions or concerns regarding dry needling or any other treatment we may be providing, please contact us at [phone]. Thank you for allowing us to assist in the care of your patient. Therapist Signature: Kia Albarado DPT, [facility]  Date: 2/22/2019     Time: 11:04 AM   NOTE TO PHYSICIAN:  PLEASE COMPLETE THE ORDERS BELOW AND FAX TO   [facility] Physical Therapy: [phone]  If you are unable to process this request in 24 hours please contact our office: [phone]  Check box     I have read the above request and AGREE to the recommendation of including dry needling as part of the plan of care. I have read the above request and DO NOT AGREE to including dry needling as part of the plan of care.     I have read the above report and request that my patient continue therapy with the following changes/special instructions: _________________________________________________     Physician Signature:        Date:       Time:

## 2019-02-22 NOTE — PROGRESS NOTES
[facility] PHYSICAL THERAPY - DAILY TREATMENT NOTE    Patient Name: Michelle Saucedo        Date: 2019  : 1970   yes Patient  Verified  Visit #:     Insurance: Payor: Alfonso Backer / Plan: [address] PT / Product Type: Commerical /      In time: 900 Out time: 930   Total Treatment Time: 40     Medicare/[employer] Time Tracking (below)   Total Timed Codes (min):  na 1:1 Treatment Time:  na     TREATMENT AREA =  Lumbar spine pain [M54.5]    SUBJECTIVE  Pain Level (on 0 to 10 scale):  4 / 10   Medication Changes/New allergies or changes in medical history, any new surgeries or procedures?    no  If yes, update Summary List   Subjective Functional Status/Changes:  []  No changes reported       I just feel really locked up for some reason. I have had this low level pain since the last visit along my buttock and sij area     OBJECTIVE      40 min Manual Therapy: L  gm, piri, rf/psoas and hs release, shotgun tech, l3/4/5 grade iv rotation correction, graston paraspinals, sacral L rotation mobs    Rationale:      decrease pain, increase ROM, increase tissue extensibility and decrease trigger points to improve patient's ability to complete adls    safely ambulate at home for self care.       Billed With/As:   [x] MT   [] TA   [] Neuro   [] Self Care Patient Education: [x] Review HEP    [] Progressed/Changed HEP based on:   [x] positioning   [x] body mechanics   [] transfers   [] heat/ice application    [] other:      Other Objective/Functional Measures:  Pt arrived to session with L on R and l3-5 in L rotation, unable to perform any L hip extension or forward bending without immediate pain  Performed MT with significant improvement in pain/mm extensibility    Post Treatment Pain Level (on 0 to 10) scale:   1/ 10     ASSESSMENT  Assessment/Changes in Function:   Pt had improved lumbar and sacral mobility allowing for ability to forward bend    []  See Progress Note/Recertification   Patient will continue to benefit from skilled PT services to modify and progress therapeutic interventions, address functional mobility deficits, address ROM deficits, address strength deficits, analyze and address soft tissue restrictions, analyze and cue movement patterns, analyze and modify body mechanics/ergonomics, assess and modify postural abnormalities and instruct in home and community integration to attain remaining goals.    Progress toward goals / Updated goals:  Pt advised to monitor s/s following session to determine if IMT is appropriate      PLAN  []  Upgrade activities as tolerated yes Continue plan of care   []  Discharge due to :    []  Other:      Therapist: Valentina Bardales PT    Date: 2/22/2019 Time: 9:35 AM     Future Appointments   Date Time Provider Geraldine Burrell   2/22/2019  8:30 AM Cleta Cranker, PT [facility]   2/25/2019  8:30 AM Cleta Cranker, PT [facility]   2/28/2019  6:00 PM Coleen Balbuena, PT [facility]   3/4/2019  3:00 PM Cleta Cranker, PT [facility]   3/7/2019  9:30 AM Cleta Cranker, PT [facility]   3/11/2019  2:00 PM Ashlyn Beverage [facility]   3/14/2019  9:30 AM Cleta Cranker, PT [facility]   3/19/2019 10:00 AM Cleta Cranker, PT [facility]   3/22/2019  9:00 AM Cleta Cranker, PT [facility]   3/25/2019  8:30 AM Cleta Cranker, PT [facility]   3/28/2019  6:00 PM Coleen Balbuena, PT [facility]

## 2019-02-25 ENCOUNTER — HOSPITAL ENCOUNTER (OUTPATIENT)
Dept: PHYSICAL THERAPY | Age: 49
Discharge: HOME | End: 2019-02-25
Payer: COMMERCIAL

## 2019-02-25 PROCEDURE — 97140 MANUAL THERAPY 1/> REGIONS: CPT | Performed by: PHYSICAL THERAPIST

## 2019-02-25 PROCEDURE — 97110 THERAPEUTIC EXERCISES: CPT | Performed by: PHYSICAL THERAPIST

## 2019-02-25 NOTE — PROGRESS NOTES
[facility] Law PHYSICAL THERAPY - DAILY TREATMENT NOTE    Patient Name: Colby Gresham        Date: 2019  : 1970   yes Patient  Verified  Visit #:   15  of   16  Insurance: Payor: Arina Macedo / Plan: [address] PT / Product Type: Commerical /      In time: 911 Out time: 336   Total Treatment Time: 65     Medicare/[facility] Time Tracking (below)   Total Timed Codes (min):  na 1:1 Treatment Time:  na     TREATMENT AREA =  Lumbar spine pain [M54.5]    SUBJECTIVE  Pain Level (on 0 to 10 scale):  2 / 10   Medication Changes/New allergies or changes in medical history, any new surgeries or procedures?    no  If yes, update Summary List   Subjective Functional Status/Changes:  []  No changes reported     I felt sore after last session but much looser. No sciatica or sij pain        OBJECTIVE      45 min Therapeutic Exercise:  [x]  See flow sheet   Rationale:      increase ROM and increase strength to improve the patients ability to complete adls            20 min Manual Therapy: L  gm, piri release, R sacral rotation/flex dtm ls paraspinals, grade I-iii pa mobs l3-5, sacral flexion mobs    Rationale:      decrease pain, increase ROM, increase tissue extensibility and decrease trigger points to improve patient's ability to complete adls    safely ambulate at home for self care.       Billed With/As:   [x] MT   [] TA   [] Neuro   [] Self Care Patient Education: [x] Review HEP    [] Progressed/Changed HEP based on:   [x] positioning   [x] body mechanics   [] transfers   [] heat/ice application    [] other:      Other Objective/Functional Measures:  Reported only dull ache along lumbosacral junction and no pain along L sacral border from previous session  Still continues with L sided paraspinal and ql tightness with pain during palpation and +jump sign at l3    Post Treatment Pain Level (on 0 to 10) scale:   1  / 10     ASSESSMENT  Assessment/Changes in Function:   Able to complete all te with reduction in pain - unable to completely abolish    []  See Progress Note/Recertification   Patient will continue to benefit from skilled PT services to modify and progress therapeutic interventions, address functional mobility deficits, address ROM deficits, address strength deficits, analyze and address soft tissue restrictions, analyze and cue movement patterns, analyze and modify body mechanics/ergonomics, assess and modify postural abnormalities and instruct in home and community integration to attain remaining goals. Progress toward goals / Updated goals:  Unable to perform IMT until clearance from Dr. Suhail Russo, will re-attempt approval and advised pt to contact him.  Believe this would improve carry over of mm extensibility between sessions      PLAN  []  Upgrade activities as tolerated yes Continue plan of care   []  Discharge due to :    []  Other:      Therapist: Valentina Bardales PT    Date: 2/25/2019 Time: 9:35 AM     Future Appointments   Date Time Provider Geraldine Burrell   2/28/2019  6:00 PM Coleen Balbuena, PT [facility]   3/4/2019  3:00 PM Cleta Cranker, PT [facility]   3/7/2019  9:30 AM Cleta Cranker, PT [facility]   3/11/2019  2:00 PM Ashlyn Beverage [facility]   3/14/2019  9:30 AM Cleta Cranker, PT [facility]   3/19/2019 10:00 AM Cleta Cranker, PT [facility]   3/22/2019  9:00 AM Cleta Cranker, PT [facility]   3/25/2019  8:30 AM Cleta Cranker, PT [facility]   3/28/2019  6:00 PM Coleen Balbuena, PT [facility]

## 2019-02-28 ENCOUNTER — HOSPITAL ENCOUNTER (OUTPATIENT)
Dept: PHYSICAL THERAPY | Age: 49
End: 2019-02-28
Payer: COMMERCIAL

## 2019-03-01 ENCOUNTER — HOSPITAL ENCOUNTER (OUTPATIENT)
Dept: PHYSICAL THERAPY | Age: 49
Discharge: HOME | End: 2019-03-01
Payer: COMMERCIAL

## 2019-03-01 PROCEDURE — 97110 THERAPEUTIC EXERCISES: CPT

## 2019-03-01 PROCEDURE — 97140 MANUAL THERAPY 1/> REGIONS: CPT

## 2019-03-01 NOTE — PROGRESS NOTES
PHYSICAL THERAPY - DAILY TREATMENT NOTE    Patient Name: Amanuel Hawk        Date: 3/1/2019  : 1970   yes Patient  Verified  Visit #:   15   of   16  Insurance: Payor: Shea Carrington / Plan: [address] PT / Product Type: Commerical /      In time: 3:10 Out time: 4:10   Total Treatment Time: 60     Medicare/[facility] Time Tracking (below)   Total Timed Codes (min):  na 1:1 Treatment Time:  na     TREATMENT AREA =  Lumbar spine pain [M54.5]  SUBJECTIVE  Pain Level (on 0 to 10 scale):  3  / 10   Medication Changes/New allergies or changes in medical history, any new surgeries or procedures?    no  If yes, update Summary List   Subjective Functional Status/Changes:  []  No changes reported     No new c/o          OBJECTIVE      35 min Therapeutic Exercise:  [x]  See flow sheet   Rationale:      increase ROM, increase strength and improve coordination to improve the patients ability to perform ADLs     15 min Manual Therapy: T/s mob, SHot gun tech, L5 ERS jimy, L on L Si jimy   Rationale:      decrease pain, increase ROM and increase tissue extensibility to improve patient's ability to perform ADLs    Dry Needling Procedure Note    Dry Needle Session Number:  1    Procedure: An intramuscular manual therapy (dry needling) and a neuro-muscular re-education treatment was done to deactivate myofascial trigger points, with a 15/30 gauge solid filament needle, under aseptic technique. Indication(s): [x] Muscle spasms [x] Myalgia/Myositis  [] Muscle cramps      [] Muscle imbalances [] TMD (TMJ) [] Myofascial pain & dysfunction     [] Other: __    Chart reviewed for the following:  Sophia MEANS, PT, have reviewed the medical history, summary list and precautions/contraindications for Amanuel Hawk.     TIME OUT performed immediately prior to start of procedure:  3:20 (enter time the timeout was completed)  Sophia MEANS PT, have performed the following reviews on Zee Ry prior to the start of the session:      [x] Patient was identified by name and date of birth    [x] Agreement on all muscles being treated was verified   [x] Purpose of dry needling, side effects, possible complications, risks and benefits were explained to the patient   [x] Procedure site(s) verified  [x] Patient was positioned for comfort and draped for privacy  [x] Informed Consent was signed (initial visit) and verified verbally (subsequent visits)  [x] Patient was instructed on the need to report the use of blood thinners and/or immunosuppressant medications  [x] How to respond to possible adverse effects of treatment  [x] Self treatment of post needling soreness: ice, heat (moist heat, heat wraps) and stretching  [x] Opportunity was given to ask any questions, all questions were answered            Treatment:  The following muscles were treated today:    Right: L/S para/multifidus, Piri   Left: L/S para/multifidus, Piri     Patients response to todays treatment:   [x]  LTRs  [x]  Muscle Relaxation  []  Pain Relief  []  Decreased Tinnitus  []  Decreased HAs []  Post needling soreness []  Increased ROM   []  Other:      Actual needle insertion time is not billed   Billed With/As:   [] TE   [] TA   [] Neuro   [] Self Care Patient Education: [x] Review HEP    [] Progressed/Changed HEP based on:   [] positioning   [] body mechanics   [] transfers   [] heat/ice application    [] other:      Other Objective/Functional Measures:    Decreased t/s mobility noted  Cont to have sig TTP at lumbar para     Post Treatment Pain Level (on 0 to 10) scale:   1  / 10     ASSESSMENT  Assessment/Changes in Function:     Good cathy to all Rx without     []  See Progress Note/Recertification   Patient will continue to benefit from skilled PT services to modify and progress therapeutic interventions, address functional mobility deficits, address ROM deficits, address strength deficits and analyze and address soft tissue restrictions to attain remaining goals.    Progress toward goals / Updated goals:    No sig change towards LTG today     PLAN  [x]  Upgrade activities as tolerated yes Continue plan of care   []  Discharge due to :    []  Other:      Therapist: Carissa Brennan PT    Date: 3/1/2019 Time: 4:33 PM     Future Appointments   Date Time Provider Geraldine Burrell   3/4/2019  3:00 PM Karma Fill, PT [facility]   3/7/2019  9:30 AM Karma Fill, PT [facility]   3/11/2019  2:00 PM Latosha Ramirez [facility]   3/14/2019  9:30 AM Karma Fill, PT [facility]   3/19/2019 10:00 AM Karma Fill, PT [facility]   3/22/2019  9:00 AM Karma Fill, PT [facility]   3/25/2019  8:30 AM Karma Fill, PT [facility]   3/28/2019  6:00 PM Geroge Burkitt, PT [facility]

## 2019-03-07 ENCOUNTER — HOSPITAL ENCOUNTER (OUTPATIENT)
Dept: PHYSICAL THERAPY | Age: 49
Discharge: HOME | End: 2019-03-07
Payer: COMMERCIAL

## 2019-03-07 PROCEDURE — 97140 MANUAL THERAPY 1/> REGIONS: CPT | Performed by: PHYSICAL THERAPIST

## 2019-03-07 NOTE — PROGRESS NOTES
PHYSICAL THERAPY - DAILY TREATMENT NOTE    Patient Name: Yash Kelly        Date: 3/7/2019  : 1970   yes Patient  Verified  Visit #:     Insurance: Payor: Barbee Mcardle / Plan: [address] PT / Product Type: Commerical /      In time: 957 Out time: 1596   Total Treatment Time: 45     Medicare/[employer] Time Tracking (below)   Total Timed Codes (min):  na 1:1 Treatment Time:  na     TREATMENT AREA =  Lumbar spine pain [M54.5]  SUBJECTIVE  Pain Level (on 0 to 10 scale):  6  / 10   Medication Changes/New allergies or changes in medical history, any new surgeries or procedures?    no  If yes, update Summary List   Subjective Functional Status/Changes:  []  No changes reported     I have had some L front thigh burning that is intermittent and B buttock pain when I sit. 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 OBJECTIVE      nc min Therapeutic Exercise:  [x]  See flow sheet   Rationale:      increase ROM, increase strength and improve coordination to improve the patients ability to perform ADLs     40 min Manual Therapy: T/s mob, SHot gun tech, L5 ERS jimy, L on L Si correction, dtm ls paraspinals, L QL release   Rationale:      decrease pain, increase ROM and increase tissue extensibility to improve patient's ability to perform ADLs    Dry Needling Procedure Note    Dry Needle Session Number:  1    Procedure: An intramuscular manual therapy (dry needling) and a neuro-muscular re-education treatment was done to deactivate myofascial trigger points, with a 15/30 gauge solid filament needle, under aseptic technique.     Indication(s): [x] Muscle spasms [x] Myalgia/Myositis  [] Muscle cramps      [] Muscle imbalances [] TMD (TMJ) [] Myofascial pain & dysfunction     [] Other: __    Chart reviewed for the following:  Horace MEANS, have reviewed the medical history, summary list and precautions/contraindications for Jose Morales.     TIME OUT performed immediately prior to start of procedure:  10:35 (enter time the timeout was completed)  I, Jazzmine Garcia, have performed the following reviews on Zee Raza prior to the start of the session:      [x] Patient was identified by name and date of birth    [x] Agreement on all muscles being treated was verified   [x] Purpose of dry needling, side effects, possible complications, risks and benefits were explained to the patient   [x] Procedure site(s) verified  [x] Patient was positioned for comfort and draped for privacy  [x] Informed Consent was signed (initial visit) and verified verbally (subsequent visits)  [x] Patient was instructed on the need to report the use of blood thinners and/or immunosuppressant medications  [x] How to respond to possible adverse effects of treatment  [x] Self treatment of post needling soreness: ice, heat (moist heat, heat wraps) and stretching  [x] Opportunity was given to ask any questions, all questions were answered            Treatment:  The following muscles were treated today:    Right: L/S para/multifidus, Piri   Left: L/S para/multifidus, Piri     Patients response to todays treatment:   [x]  LTRs  [x]  Muscle Relaxation  []  Pain Relief  []  Decreased Tinnitus  []  Decreased HAs []  Post needling soreness []  Increased ROM   []  Other:      Actual needle insertion time is not billed   Billed With/As:   [] TE   [] TA   [] Neuro   [] Self Care Patient Education: [x] Review HEP    [] Progressed/Changed HEP based on:   [] positioning   [] body mechanics   [] transfers   [] heat/ice application    [] other:      Other Objective/Functional Measures:    Noted L5 L rotation and l1 rr with  L on L innominate   Reduced hip extension mobility that improved following mt   Due to time constraint unable to complete te   Post Treatment Pain Level (on 0 to 10) scale:   3  / 10     ASSESSMENT  Assessment/Changes in Function:     Reported 50% reduction in pain following session      []  See Progress Note/Recertification   Patient will continue to benefit from skilled PT services to modify and progress therapeutic interventions, address functional mobility deficits, address ROM deficits, address strength deficits and analyze and address soft tissue restrictions to attain remaining goals.    Progress toward goals / Updated goals:    Continues to have intermittent pain levels that improve following session but will increase with work related activity and thus varying carry over     PLAN  [x]  Upgrade activities as tolerated yes Continue plan of care   []  Discharge due to :    []  Other:      Therapist: Inocencia Sims PT/Darin Stiles PT, OCS, SCS, CSCS    Date: 3/7/2019 Time: 4:33 PM     Future Appointments   Date Time Provider Geraldine Burrell   3/11/2019  2:00 PM Thomas Reusing [facility]   3/14/2019  9:30 AM Jarrod Spangler, PT [facility]   3/19/2019 10:00 AM Wilton Wills, PT [facility]   3/22/2019  9:30 AM Jarrod Spangler PT [facility]   3/25/2019  8:30 AM Wilton Wills PT [facility]   3/28/2019  6:00 PM Donnie Monge, PT [facility]

## 2019-03-11 ENCOUNTER — HOSPITAL ENCOUNTER (OUTPATIENT)
Dept: PHYSICAL THERAPY | Age: 49
Discharge: HOME | End: 2019-03-11
Payer: COMMERCIAL

## 2019-03-11 PROCEDURE — 97110 THERAPEUTIC EXERCISES: CPT | Performed by: PHYSICAL THERAPIST

## 2019-03-11 PROCEDURE — 97140 MANUAL THERAPY 1/> REGIONS: CPT | Performed by: PHYSICAL THERAPIST

## 2019-03-11 NOTE — PROGRESS NOTES
.ELI [address], [address],[facility], [address] - Phone: [phone]  Fax: [phone]  PROGRESS NOTE  Patient Name: Tammi Evans : 1970   Treatment/Medical Diagnosis: Lumbar spine pain [M54.5]   Referral Source: Tami Layton DO     Date of Initial Visit: 1/10/19 Attended Visits: 16 Missed Visits: 1     SUMMARY OF TREATMENT  Pt has been treated at this facility 2-3x/week for a total of 16 visits with treatment consisting of directional based exercises for rom and core stability, manual therapy (prom/mobs/dtm), IMT and modalities prn. In addition pt was instructed on hep  CURRENT STATUS  Due to scheduling conflicts pt has been able to attend 6 total visits in last month with 2 of those including dry needling. She responded very well with IMT including reduction in pain, muscular restrictions and improved rom. Pt has made good progress with PT thus far. Currently rates pain 1-5/10 with exacerbation during prolonged sitting (> 1 hour) or repetitive bending - she now reports L anterior thigh numbness and B buttock pain during these activities. She has intermittent SIJ dysfunction. Continues with reduced glute med/max, transverse abdominal and multifidi strength. She does report a 70% improvement in her s/s. Goal/Measure of Progress Goal Met? 1. Pt will increase LS arom within 25% in all directions to A with dressing   Status at last Eval: Flex 50%, l rot 60%, L sb 25%, ext 25% Current Status: L rotation/flex 25% progressing   2. Pt will note daily max pain </=4/10 in order to increase participation in adls   Status at last Eval: 5/10 Current Status: 5/10 progressing   3. Pt will increase FOTO by 31 points in order to show functional improvement   Status at last Eval:  Current Status: 65/100 yes     New Goals to be achieved in __4__  weeks:  1. Achieve goal #1  2. Achieve goal #2  3.  Pt will be I with high level hep in order to prepare for d/c   RECOMMENDATIONS  Continue therapy an additional 2x/4 weeks  If you have any questions/comments please contact us directly at [phone]. Thank you for allowing us to assist in the care of your patient. Therapist Signature: Bonnie Lynn DPT, [facility]  Date: 3/11/2019     Time: 11:09 AM   NOTE TO PHYSICIAN:  PLEASE COMPLETE THE ORDERS BELOW AND FAX TO   [facility] Physical Therapy: [phone]  If you are unable to process this request in 24 hours please contact our office: [phone]    ___ I have read the above report and request that my patient continue as recommended.   ___ I have read the above report and request that my patient continue therapy with the following changes/special instructions:_________________________________________________________   ___ I have read the above report and request that my patient be discharged from therapy.      Physician Signature:        Date:       Time:

## 2019-03-11 NOTE — PROGRESS NOTES
Mirella Law   PHYSICAL THERAPY - DAILY TREATMENT NOTE    Patient Name: Colby Gresham        Date: 3/11/2019  : 1970   yes Patient  Verified  Visit #:     Insurance: Payor: Arina Macedo / Plan: [address] PT / Product Type: Commerical /      In time: 205 Out time: 310   Total Treatment Time: 65     Medicare/[employer] Time Tracking (below)   Total Timed Codes (min):  na 1:1 Treatment Time:  na     TREATMENT AREA =  Lumbar spine pain [M54.5]    SUBJECTIVE  Pain Level (on 0 to 10 scale):  2 / 10   Medication Changes/New allergies or changes in medical history, any new surgeries or procedures?    no  If yes, update Summary List   Subjective Functional Status/Changes:  []  No changes reported     See pn        OBJECTIVE  Modalities Rationale:     decrease inflammation, decrease pain and increase tissue extensibility to improve patient's ability to complete adls                          min [] Estim, type/location:                                      []  att     []  unatt     []  w/US     []  w/ice    []  w/heat     min []  Mechanical Traction: type/lbs                    []  pro   []  sup   []  int   []  cont    []  before manual    []  after manual     min []  Ultrasound, settings/location:        min []  Iontophoresis w/ dexamethasone, location:                                                []  take home patch       []  in clinic   10 min [x]  Ice     []  Heat    location/position:       min []  Vasopneumatic Device, press/temp:       min []  Other:     [x] Skin assessment post-treatment (if applicable):    []  intact    [x]  redness- no adverse reaction     []redness - adverse reaction:        40 min Therapeutic Exercise:  [x]  See flow sheet   Rationale:      increase ROM and increase strength to improve the patients ability to complete adls            15 min Manual Therapy: L  gm, piri release, R sacral rotation/flex dtm ls paraspinals, grade I-iii pa mobs l3-5, sacral flexion mobs    Rationale: decrease pain, increase ROM, increase tissue extensibility and decrease trigger points to improve patient's ability to complete adls    safely ambulate at home for self care. Billed With/As:   [x] MT   [] TA   [] Neuro   [] Self Care Patient Education: [x] Review HEP    [] Progressed/Changed HEP based on:   [x] positioning   [x] body mechanics   [] transfers   [] heat/ice application    [] other:      Other Objective/Functional Measures:  See pn    Post Treatment Pain Level (on 0 to 10) scale:   1  / 10     ASSESSMENT  Assessment/Changes in Function:   See pn    []  See Progress Note/Recertification   Patient will continue to benefit from skilled PT services to modify and progress therapeutic interventions, address functional mobility deficits, address ROM deficits, address strength deficits, analyze and address soft tissue restrictions, analyze and cue movement patterns, analyze and modify body mechanics/ergonomics, assess and modify postural abnormalities and instruct in home and community integration to attain remaining goals.    Progress toward goals / Updated goals:  See pn      PLAN  []  Upgrade activities as tolerated yes Continue plan of care   []  Discharge due to :    []  Other:      Therapist: Wilfird Hendrickson PT    Date: 3/11/2019 Time: 9:35 AM     Future Appointments   Date Time Provider Geraldine Burrell   3/14/2019  9:30 AM Genna Fraire PT [facility]   3/19/2019 10:00 AM Richard Vail PT [facility]   3/22/2019  9:30 AM Genna Fraire PT [facility]   3/25/2019  8:30 AM Richard Vail PT [facility]   3/28/2019  6:00 PM Casie Angulo, PT [facility]

## 2019-03-14 ENCOUNTER — HOSPITAL ENCOUNTER (OUTPATIENT)
Dept: PHYSICAL THERAPY | Age: 49
Discharge: HOME | End: 2019-03-14
Payer: COMMERCIAL

## 2019-03-14 PROCEDURE — 97110 THERAPEUTIC EXERCISES: CPT

## 2019-03-14 PROCEDURE — 97140 MANUAL THERAPY 1/> REGIONS: CPT

## 2019-03-14 NOTE — PROGRESS NOTES
PHYSICAL THERAPY - DAILY TREATMENT NOTE    Patient Name: Jose Morales        Date: 3/14/2019  : 1970   yes Patient  Verified  Visit #:     Insurance: Payor: Yared Booker / Plan: [address] PT / Product Type: Commerical /      In time: 9:45 Out time: 10:54   Total Treatment Time: 69     Medicare/[facility] Time Tracking (below)   Total Timed Codes (min):  na 1:1 Treatment Time:  na     TREATMENT AREA =  Lumbar spine pain [M54.5]  SUBJECTIVE  Pain Level (on 0 to 10 scale):  5   10   Medication Changes/New allergies or changes in medical history, any new surgeries or procedures?    no  If yes, update Summary List   Subjective Functional Status/Changes:  []  No changes reported     My LB is doing better, but my L knee has been bothering me for some reason          OBJECTIVE  40 min Therapeutic Exercise:  [x]  See flow sheet   Rationale:      increase ROM, increase strength and improve coordination to improve the patients ability to perform ADLs      20 min Manual Therapy: T/s mob, SHot gun tech, L5 ERS jimy, L on L Si correction,   Rationale:      decrease pain, increase ROM and increase tissue extensibility to improve patient's ability to perform ADLs     Dry Needling Procedure Note     Dry Needle Session Number:  3     Procedure:    An intramuscular manual therapy (dry needling) and a neuro-muscular re-education treatment was done to deactivate myofascial trigger points, with a 15/30 gauge solid filament needle, under aseptic technique.     Indication(s):        [x] Muscle spasms      [x] Myalgia/Myositis  [] Muscle cramps                                         [] Muscle imbalances         [] TMD (TMJ)          [] Myofascial pain & dysfunction                 [] Other: __     Chart reviewed for the following:  I, Jazzmine Garcia, have reviewed the medical history, summary list and precautions/contraindications for Jose Morales.     TIME OUT performed immediately prior to start of procedure:  9:54 (enter time the timeout was completed)  I, Jodie Abreu, have performed the following reviews on Zee Raza prior to the start of the session:      [x] Patient was identified by name and date of birth    [x] Agreement on all muscles being treated was verified   [x] Purpose of dry needling, side effects, possible complications, risks and benefits were explained to the patient   [x] Procedure site(s) verified  [x] Patient was positioned for comfort and draped for privacy  [x] Informed Consent was signed (initial visit) and verified verbally (subsequent visits)  [x] Patient was instructed on the need to report the use of blood thinners and/or immunosuppressant medications  [x] How to respond to possible adverse effects of treatment  [x] Self treatment of post needling soreness: ice, heat (moist heat, heat wraps) and stretching  [x] Opportunity was given to ask any questions, all questions were answered     Treatment:  The following muscles were treated today:     Right: L/S para/multifidus, Piri   Left: L/S para/multifidus, sartorius, BF      Patients response to todays treatment:   [x]  LTRs               [x]  Muscle Relaxation                      []  Pain Relief                     []  Decreased Tinnitus  []  Decreased HAs           []  Post needling soreness  []  Increased ROM              []  Other:        Actual needle insertion time is not billed     Billed With/As:   [] TE   [] TA   [] Neuro   [] Self Care Patient Education: [x] Review HEP    [] Progressed/Changed HEP based on:   [] positioning   [] body mechanics   [] transfers   [] heat/ice application    [] other:      Other Objective/Functional Measures:    Sig increase in soft tissue tension noted at sartorius     Post Treatment Pain Level (on 0 to 10) scale:   1-2  / 10     ASSESSMENT  Assessment/Changes in Function:     Good cathy to all Rx without increase in pain      []  See Progress Note/Recertification   Patient will continue to benefit from skilled PT services to modify and progress therapeutic interventions, address functional mobility deficits, address ROM deficits and address strength deficits to attain remaining goals.    Progress toward goals / Updated goals:    Slowly progressing with pain reduction     PLAN  [x]  Upgrade activities as tolerated yes Continue plan of care   []  Discharge due to :    []  Other:      Therapist: Sachin Nobles PT    Date: 3/14/2019 Time: 6:31 AM     Future Appointments   Date Time Provider Geraldine Burrell   3/14/2019  9:30 AM Iza Navarro, PT [facility]   3/19/2019 10:00 AM Rosy Ruby, PT [facility]   3/22/2019  9:30 AM Iza Navarro, PT [facility]   3/25/2019  8:30 AM Rosy Ruby, PT [facility]   3/28/2019  6:00 PM Yoly Showers, PT [facility]

## 2019-03-19 ENCOUNTER — HOSPITAL ENCOUNTER (OUTPATIENT)
Dept: PHYSICAL THERAPY | Age: 49
Discharge: HOME | End: 2019-03-19
Payer: COMMERCIAL

## 2019-03-19 PROCEDURE — 97140 MANUAL THERAPY 1/> REGIONS: CPT | Performed by: PHYSICAL THERAPIST

## 2019-03-19 PROCEDURE — 97110 THERAPEUTIC EXERCISES: CPT | Performed by: PHYSICAL THERAPIST

## 2019-03-19 NOTE — PROGRESS NOTES
[facility] PHYSICAL THERAPY - DAILY TREATMENT NOTE    Patient Name: Melecio Chowdary        Date: 3/19/2019  : 1970   yes Patient  Verified  Visit #:     Insurance: Payor: Naomi Samuel / Plan: Maite George PT / Product Type: Commerical /      In time: 1005 Out time: 1128   Total Treatment Time: 76     Medicare/[facility] Time Tracking (below)   Total Timed Codes (min):  na 1:1 Treatment Time:  na     TREATMENT AREA =  Lumbar spine pain [M54.5]    SUBJECTIVE  Pain Level (on 0 to 10 scale):   10   Medication Changes/New allergies or changes in medical history, any new surgeries or procedures?    no  If yes, update Summary List   Subjective Functional Status/Changes:  []  No changes reported   I am not really getting that hip pain but I am still having the sij pain and anterior thigh warmth down my leg. This comes and goes with no warming        OBJECTIVE    50 min Therapeutic Exercise:  [x]  See flow sheet   Rationale:      increase ROM and increase strength to improve the patients ability to complete adls            26 min Manual Therapy: L  gm, piri and ql release, R sacral rotation/flex dtm ls paraspinals, grade I-iii pa mobs l3-5, sacral flexion mobs   Rationale:      decrease pain, increase ROM, increase tissue extensibility and decrease trigger points to improve patient's ability to complete adls    safely ambulate at home for self care.       Billed With/As:   [x] MT   [] TA   [] Neuro   [] Self Care Patient Education: [x] Review HEP    [] Progressed/Changed HEP based on:   [x] positioning   [x] body mechanics   [] transfers   [] heat/ice application    [] other:      Other Objective/Functional Measures:  Progressed doni extension to include lock and sag as well as L lateral shift at wall, denied any LE change (advised to completed on own to address continued LE pain)  Continues to have pain with any L lumbar rotation and flexion based te/adls     Post Treatment Pain Level (on 0 to 10) scale:   2  / 10     ASSESSMENT  Assessment/Changes in Function:   Continues to have s/s consistent with posteiror derangement and underlying sij dysfunction. This is limiting sitting <1 hour due to c/o lumbar pain and reduced nora due c/o sij discomfort   []  See Progress Note/Recertification   Patient will continue to benefit from skilled PT services to modify and progress therapeutic interventions, address functional mobility deficits, address ROM deficits, address strength deficits, analyze and address soft tissue restrictions, analyze and cue movement patterns, analyze and modify body mechanics/ergonomics, assess and modify postural abnormalities and instruct in home and community integration to attain remaining goals.    Progress toward goals / Updated goals:  Pt is making very slow progress with consistent pain reduction      PLAN  []  Upgrade activities as tolerated yes Continue plan of care   []  Discharge due to :    []  Other:      Therapist: Marissa De Leon PT    Date: 3/19/2019 Time: 9:35 AM     Future Appointments   Date Time Provider Geraldine Burrell   3/22/2019  9:30 AM Nori Davidson, PT [facility]   3/25/2019  8:30 AM Jese Joya, PT [facility]   3/28/2019  6:00 PM Indio Chen, PT [facility]   4/2/2019  5:30 PM Nori Davidson, PT [facility]   4/4/2019  5:30 PM Jese Joya, PT [facility]   4/8/2019  2:00 PM [facility]   4/10/2019  9:30 AM Nori Davidson, PT [facility]   4/16/2019 10:00 AM Nori Davidson, PT [facility]   4/18/2019  5:30 PM Jese Joya, PT [facility]   4/23/2019  5:30 PM Jese Joya, PT [facility]   4/25/2019  9:30 AM Nori Davidson, PT [facility]

## 2019-03-22 ENCOUNTER — HOSPITAL ENCOUNTER (OUTPATIENT)
Dept: PHYSICAL THERAPY | Age: 49
Discharge: HOME | End: 2019-03-22
Payer: COMMERCIAL

## 2019-03-22 PROCEDURE — 97110 THERAPEUTIC EXERCISES: CPT

## 2019-03-22 PROCEDURE — 97140 MANUAL THERAPY 1/> REGIONS: CPT

## 2019-03-22 NOTE — PROGRESS NOTES
PHYSICAL THERAPY - DAILY TREATMENT NOTE    Patient Name: Tammi Evans        Date: 3/22/2019  : 1970   yes Patient  Verified  Visit #:      of     Insurance: Payor: John Lovelaec / Plan: [address] PT / Product Type: Commerical /      In time: 9:45 Out time: 10:45   Total Treatment Time: 60     Medicare/[facility] Time Tracking (below)   Total Timed Codes (min):  na 1:1 Treatment Time:  na     TREATMENT AREA =  Lumbar spine pain [M54.5]  SUBJECTIVE  Pain Level (on 0 to 10 scale):  3  / 10   Medication Changes/New allergies or changes in medical history, any new surgeries or procedures?    no  If yes, update Summary List   Subjective Functional Status/Changes:  []  No changes reported     No new c/o          OBJECTIVE  30 min Therapeutic Exercise:  [x]  See flow sheet   Rationale:      increase ROM, increase strength and improve coordination to improve the patients ability to perform ADLs      20 min Manual Therapy: T/s mob, SHot gun tech, L5 ERS jimy, L on L Si correction,   Rationale:      decrease pain, increase ROM and increase tissue extensibility to improve patient's ability to perform ADLs     Dry Needling Procedure Note     Dry Needle Session Number:  4     Procedure:    An intramuscular manual therapy (dry needling) and a neuro-muscular re-education treatment was done to deactivate myofascial trigger points, with a 15/30 gauge solid filament needle, under aseptic technique.     Indication(s):        [x] Muscle spasms      [x] Myalgia/Myositis  [] Muscle cramps                                         [] Muscle imbalances         [] TMD (TMJ)          [] Myofascial pain & dysfunction                 [] Other: __     Chart reviewed for the following:  Will Glynn, have reviewed the medical history, summary list and precautions/contraindications for Zee Raza.     TIME OUT performed immediately prior to start of procedure:  9:55 (enter time the timeout was completed)  Darin MEANS Ingrid Wharton, have performed the following reviews on Zee Raza prior to the start of the session:      [x] Patient was identified by name and date of birth    [x] Agreement on all muscles being treated was verified   [x] Purpose of dry needling, side effects, possible complications, risks and benefits were explained to the patient   [x] Procedure site(s) verified  [x] Patient was positioned for comfort and draped for privacy  [x] Informed Consent was signed (initial visit) and verified verbally (subsequent visits)  [x] Patient was instructed on the need to report the use of blood thinners and/or immunosuppressant medications  [x] How to respond to possible adverse effects of treatment  [x] Self treatment of post needling soreness: ice, heat (moist heat, heat wraps) and stretching  [x] Opportunity was given to ask any questions, all questions were answered     Treatment:  The following muscles were treated today:     Right: L/S para/multifidus, Piri   Left: L/S para/multifidus, RF      Patients response to todays treatment:   [x]  LTRs               [x]  Muscle Relaxation                      []  Pain Relief                     []  Decreased Tinnitus  []  Decreased HAs           []  Post needling soreness  []  Increased ROM              []  Other:        Actual needle insertion time is not billed   Billed With/As:   [] TE   [] TA   [] Neuro   [] Self Care Patient Education: [x] Review HEP    [] Progressed/Changed HEP based on:   [] positioning   [] body mechanics   [] transfers   [] heat/ice application    [] other:      Other Objective/Functional Measures:    Cont to present with sig increase in soft tissue tension noted at L para     Post Treatment Pain Level (on 0 to 10) scale:   1  / 10     ASSESSMENT  Assessment/Changes in Function:     Good cathy to all Rx without increase in pain      []  See Progress Note/Recertification   Patient will continue to benefit from skilled PT services to modify and progress therapeutic interventions, address functional mobility deficits and address ROM deficits to attain remaining goals.    Progress toward goals / Updated goals:    Slow progress with pain reduction      PLAN  [x]  Upgrade activities as tolerated yes Continue plan of care   []  Discharge due to :    []  Other:      Therapist: Sada Pineda PT    Date: 3/22/2019 Time: 6:35 AM     Future Appointments   Date Time Provider Geraldine Burrell   3/22/2019  9:30 AM Catalina Marker, PT [facility]   3/25/2019  8:30 AM Vargas Coral, PT [facility]   3/28/2019  6:00 PM Florin Stoll, PT [facility]   4/2/2019  5:30 PM Catalina Marker, PT [facility]   4/4/2019  5:30 PM Vargas Coral, PT [facility]   4/8/2019  2:00 PM Gilberto Jeffery [facility]   4/10/2019  9:30 AM Catalina Marker, PT [facility]   4/16/2019 10:00 AM Catalina Marker, PT [facility]   4/18/2019  5:30 PM Vargas Coral, PT [facility]   4/23/2019  5:30 PM Vargas Coral, PT [facility]   4/25/2019  9:30 AM Catalina Marker, PT [facility]

## 2019-03-25 ENCOUNTER — APPOINTMENT (OUTPATIENT)
Dept: PHYSICAL THERAPY | Age: 49
End: 2019-03-25
Payer: COMMERCIAL

## 2019-03-28 ENCOUNTER — HOSPITAL ENCOUNTER (OUTPATIENT)
Dept: PHYSICAL THERAPY | Age: 49
Discharge: HOME | End: 2019-03-28
Payer: COMMERCIAL

## 2019-03-28 PROCEDURE — 97110 THERAPEUTIC EXERCISES: CPT

## 2019-03-28 PROCEDURE — 97140 MANUAL THERAPY 1/> REGIONS: CPT

## 2019-03-28 NOTE — PROGRESS NOTES
PHYSICAL THERAPY - DAILY TREATMENT NOTE    Patient Name: Chloé Jenkins        Date: 3/28/2019  : 1970   yes Patient  Verified  Visit #:     Insurance: Payor: Catalina Wisdom / Plan: [address] PT / Product Type: Commerical /      In time: 545 Out time: 919   Total Treatment Time: 61     Medicare/[facility] Time Tracking (below)   Total Timed Codes (min):  na 1:1 Treatment Time:  na     TREATMENT AREA =  Lumbar spine pain [M54.5]    SUBJECTIVE  Pain Level (on 0 to 10 scale):  3  / 10   Medication Changes/New allergies or changes in medical history, any new surgeries or procedures?    no  If yes, update Summary List   Subjective Functional Status/Changes:  []  No changes reported     Patient reports she was sick all last week and had a busy work schedule so she hasn't been able to do any of her exercises. Patient notes \"pressure\" at her mid to lower back as well as symptoms going down the outside of her R thigh. OBJECTIVE    41 min Therapeutic Exercise:  [x]  See flow sheet   Rationale:      increase ROM and increase strength to improve the patients ability to perform work activities. 20 min Manual Therapy: STM to distal thoracic paraspinals, R glute med and piriformis; HVLAT manipulation to thoracic spine in supine; MET for R anteriorly rotated innominate   Rationale:      decrease pain, increase ROM, increase tissue extensibility and decrease trigger points to improve patient's ability to perform standing activities.      Billed With/As:   [x] TE   [] TA   [] Neuro   [] Self Care Patient Education: [x] Review HEP    [] Progressed/Changed HEP based on:   [] positioning   [] body mechanics   [] transfers   [] heat/ice application    [] other:      Other Objective/Functional Measures:    Tenderness noted to L thoracic paraspinals and R glute med during MT  Resumed exercise program per flowsheet in order to improve core and pelvic strength and stabilization     Post Treatment Pain Level (on 0 to 10) scale:   1  / 10     ASSESSMENT  Assessment/Changes in Function:     Patient noted a reduction in symptoms post session. Plan to resume DN treatments next week in order to reduce pain and improve tolerance to work activities. []  See Progress Note/Recertification   Patient will continue to benefit from skilled PT services to modify and progress therapeutic interventions, address functional mobility deficits, address ROM deficits, address strength deficits, analyze and address soft tissue restrictions, analyze and cue movement patterns, analyze and modify body mechanics/ergonomics and assess and modify postural abnormalities to attain remaining goals.    Progress toward goals / Updated goals:    Progressing with LTG#2- pain ranging from 0-3/10 during today's session     PLAN  [x]  Upgrade activities as tolerated yes Continue plan of care   []  Discharge due to :    []  Other:      Therapist: Jessie Morales PT    Date: 3/28/2019 Time: 6:48 PM     Future Appointments   Date Time Provider Geraldine Burrell   4/2/2019  5:30 PM Yan Daniel, PT [facility]   4/4/2019  5:30 PM Elizabeth Sandoval, PT [address] [place] [address]   4/8/2019  2:00 PM Elizabeth Sandoval, PT [facility]   4/10/2019  9:30 AM Yan Daniel, PT [address] [place] [address]   4/16/2019 10:00 AM Mamadoudal María, PT [facility]   4/18/2019  5:30 PM Elizabeth Sandoval PT [facility]   4/23/2019  5:30 PM Elizabeth Sandoval, PT [facility]   4/25/2019  9:30 AM Rondal Alar, PT [facility]

## 2019-04-08 ENCOUNTER — HOSPITAL ENCOUNTER (OUTPATIENT)
Dept: PHYSICAL THERAPY | Age: 49
Discharge: HOME | End: 2019-04-08
Payer: COMMERCIAL

## 2019-04-08 ENCOUNTER — APPOINTMENT (OUTPATIENT)
Dept: PHYSICAL THERAPY | Age: 49
End: 2019-04-08
Payer: COMMERCIAL

## 2019-04-08 PROCEDURE — 97110 THERAPEUTIC EXERCISES: CPT

## 2019-04-08 PROCEDURE — 97140 MANUAL THERAPY 1/> REGIONS: CPT

## 2019-04-08 NOTE — PROGRESS NOTES
PHYSICAL THERAPY - DAILY TREATMENT NOTE    Patient Name: Jeannie England        Date: 2019  : 1970   yes Patient  Verified  Visit #:     Insurance: Payor: Jack Kang / Plan: [address] PT / Product Type: Commerical /      In time: 206 Out time: 241   Total Treatment Time: 35     Medicare/[facility] Time Tracking (below)   Total Timed Codes (min):  na 1:1 Treatment Time:  na     TREATMENT AREA =  Lumbar spine pain [M54.5]    SUBJECTIVE  Pain Level (on 0 to 10 scale):  5  / 10   Medication Changes/New allergies or changes in medical history, any new surgeries or procedures?    no  If yes, update Summary List   Subjective Functional Status/Changes:  []  No changes reported     Patient she has been very busy at work and has been unable to get to PT or perform much exercise at home. Patient states she has been frustrated with continued pain and her lack of ability to do normal exercise activities. Patient continues to experience symptoms at the L side of her hip and down the front of her L thigh. OBJECTIVE    10 min Therapeutic Exercise:  [x]  See flow sheet   Rationale:      increase ROM and increase strength to improve the patients ability to perform work activities. 25 min Manual Therapy: STM to L QL, L lumbar para, L glute med, R IP, R RF; MET for R anterior innominate    Rationale:      decrease pain, increase ROM, increase tissue extensibility and decrease trigger points to improve patient's ability to perform walking activities.      Billed With/As:   [x] TE   [] TA   [] Neuro   [] Self Care Patient Education: [x] Review HEP    [] Progressed/Changed HEP based on:   [] positioning   [] body mechanics   [] transfers   [] heat/ice application    [] other:      Other Objective/Functional Measures:    Patient was unable to perform additional TE due to time constraints, plan to resume at [place]  Patient presenting with significant tenderness to L glute med and R RF during MT Post Treatment Pain Level (on 0 to 10) scale:   1  / 10     ASSESSMENT  Assessment/Changes in Function:     Patient noting reduced pain post session. Educated patient on importance of consistent attendance and performance of PT exercises in order to better manage symptoms. Patient acknowledged understanding and stated she would attempt to be more compliant. []  See Progress Note/Recertification   Patient will continue to benefit from skilled PT services to modify and progress therapeutic interventions, address functional mobility deficits, address ROM deficits, address strength deficits, analyze and address soft tissue restrictions, analyze and cue movement patterns, analyze and modify body mechanics/ergonomics and assess and modify postural abnormalities to attain remaining goals.    Progress toward goals / Updated goals:    No significant progress with LTG\"s this session     PLAN  [x]  Upgrade activities as tolerated yes Continue plan of care   []  Discharge due to :    []  Other:      Therapist: Jud Goldman PT    Date: 4/8/2019 Time: 3:40 PM     Future Appointments   Date Time Provider Geraldine Burrell   4/10/2019  9:30 AM Fred Delvalle PT [facility]   4/12/2019  2:30 PM Fred Delvalle PT [facility]   4/16/2019 10:00 AM Fred Delvalle PT [facility]   4/18/2019  5:30 PM Roberto Kaiser, PT [facility]   4/23/2019  5:30 PM Roberto Kaiser, PT [facility]   4/25/2019  9:30 AM Fred Delvalle PT [facility]

## 2019-04-09 ENCOUNTER — APPOINTMENT (OUTPATIENT)
Dept: PHYSICAL THERAPY | Age: 49
End: 2019-04-09
Payer: COMMERCIAL

## 2019-04-10 ENCOUNTER — HOSPITAL ENCOUNTER (OUTPATIENT)
Dept: PHYSICAL THERAPY | Age: 49
Discharge: HOME | End: 2019-04-10
Payer: COMMERCIAL

## 2019-04-10 PROCEDURE — 97110 THERAPEUTIC EXERCISES: CPT

## 2019-04-10 PROCEDURE — 97140 MANUAL THERAPY 1/> REGIONS: CPT

## 2019-04-10 NOTE — PROGRESS NOTES
PHYSICAL THERAPY - DAILY TREATMENT NOTE    Patient Name: Kaleigh Mcrae        Date: 4/10/2019  : 1970   yes Patient  Verified  Visit #:      of     Insurance: Payor: Naina Comment / Plan: [address] PT / Product Type: Commerical /      In time: 9:35 Out time: 10:55   Total Treatment Time: 80     Medicare/[facility] Time Tracking (below)   Total Timed Codes (min):  na 1:1 Treatment Time:  na     TREATMENT AREA =  Lumbar spine pain [M54.5]  SUBJECTIVE  Pain Level (on 0 to 10 scale):  3  / 10   Medication Changes/New allergies or changes in medical history, any new surgeries or procedures?    no  If yes, update Summary List   Subjective Functional Status/Changes:  []  No changes reported     Its very sore. OBJECTIVE  30 min Therapeutic Exercise:  [x]  See flow sheet   Rationale:      increase ROM, increase strength and improve coordination to improve the patients ability to perform ADLs      30 min Manual Therapy: T/s mob, SHot gun tech, L5 ERS jimy, L on L Si correction,   Rationale:      decrease pain, increase ROM and increase tissue extensibility to improve patient's ability to perform ADLs     Dry Needling Procedure Note     Dry Needle Session Number:  5     Procedure:    An intramuscular manual therapy (dry needling) and a neuro-muscular re-education treatment was done to deactivate myofascial trigger points, with a 15/30 gauge solid filament needle, under aseptic technique.     Indication(s):        [x] Muscle spasms      [x] Myalgia/Myositis  [] Muscle cramps                                         [] Muscle imbalances         [] TMD (TMJ)          [] Myofascial pain & dysfunction                 [] Other: __     Chart reviewed for the following:  Baltazar Peña, have reviewed the medical history, summary list and precautions/contraindications for Zee Raza.     TIME OUT performed immediately prior to start of procedure:  9:40 (enter time the timeout was completed)  Edgar Bueno, have performed the following reviews on Zee Raza prior to the start of the session:      [x] Patient was identified by name and date of birth    [x] Agreement on all muscles being treated was verified   [x] Purpose of dry needling, side effects, possible complications, risks and benefits were explained to the patient   [x] Procedure site(s) verified  [x] Patient was positioned for comfort and draped for privacy  [x] Informed Consent was signed (initial visit) and verified verbally (subsequent visits)  [x] Patient was instructed on the need to report the use of blood thinners and/or immunosuppressant medications  [x] How to respond to possible adverse effects of treatment  [x] Self treatment of post needling soreness: ice, heat (moist heat, heat wraps) and stretching  [x] Opportunity was given to ask any questions, all questions were answered     Treatment:  The following muscles were treated today:     Right: L/S para/multifidus, Piri   Left: L/S para/multifidus, T/S para/multifidus      Patients response to todays treatment:   [x]  LTRs               [x]  Muscle Relaxation                      []  Pain Relief                     []  Decreased Tinnitus  []  Decreased HAs           []  Post needling soreness  []  Increased ROM              []  Other:         Billed With/As:   [] TE   [] TA   [] Neuro   [] Self Care Patient Education: [x] Review HEP    [] Progressed/Changed HEP based on:   [] positioning   [] body mechanics   [] transfers   [] heat/ice application    [] other:      Other Objective/Functional Measures:    FOTO = 56  Discussed importance of improving t/s mobility and posture     Post Treatment Pain Level (on 0 to 10) scale:   1  / 10     ASSESSMENT  Assessment/Changes in Function:     Cont to demonstrate FH posture     []  See Progress Note/Recertification   Patient will continue to benefit from skilled PT services to modify and progress therapeutic interventions, address functional mobility deficits and address ROM deficits to attain remaining goals.    Progress toward goals / Updated goals:    Slow progress with pain reduction      PLAN  [x]  Upgrade activities as tolerated yes Continue plan of care   []  Discharge due to :    []  Other:      Therapist: Elias Go PT    Date: 4/10/2019 Time: 6:27 AM     Future Appointments   Date Time Provider Geraldine Burrell   4/10/2019  9:30 AM Danella Meter, PT [facility]   4/12/2019  2:30 PM Danella Meter, PT [facility]   4/16/2019 10:00 AM Danella Meter, PT [facility]   4/18/2019  5:30 PM Lucia Busby, PT [facility]   4/23/2019  5:30 PM Lucia Busby, PT [facility]   4/25/2019  9:30 AM Danella Meter, PT [facility]

## 2019-04-11 NOTE — PROGRESS NOTES
[address] [place] [address], [address] [place] [address] - Phone: [phone]  Fax: [phone]  PROGRESS NOTE  Patient Name: Grace Jacinto : 1970   Treatment/Medical Diagnosis: Lumbar spine pain [M54.5]   Referral Source: Mia Hillman DO       Date of Initial Visit: 1/10/19 Attended Visits: 22 Missed Visits: 1      SUMMARY OF TREATMENT  Pt has been treated at this facility 2-3x/week for a total of 22 visits with treatment consisting of directional based exercises for rom and core stability, manual therapy (prom/mobs/dtm), IMT and modalities prn. In addition pt was instructed on hep  CURRENT STATUS  Due to scheduling conflicts pt has been able to attend 6 total visits in the last month. She responded very well to lumbar mobilization and dry needling. However, she continues to exacerbate her symptom at work and has not been able to maintain her reduction of pain in the clinic. She continues to complain of  prolonged sitting (> 1 hour) or repetitive bending.             Goal/Measure of Progress Goal Met? 1. Pt will increase LS arom within 25% in all directions to A with dressing   Status at last Eval: Flex 50%, l rot 60%, L sb 25%, ext 25% Current Status: L rotation/flex 25% progressing   2. Pt will note daily max pain </=4/10 in order to increase participation in adls   Status at last Eval: 5/10 Current Status: 5/10 progressing   3. Pt will be I with high level hep in order to prepare for d/c   Status at last Eval: na Current Status: na na      New Goals to be achieved in __4__  weeks:  1. Continue with all goals above   2.     3.     RECOMMENDATIONS    Specifics: 1-2x/wk x 4 more wks  If you have any questions/comments please contact us directly at [phone]. Thank you for allowing us to assist in the care of your patient.     Therapist Signature: Odin Medina, DPT, OCS, SCS, CSCS Date: 2019     Time: 7:16 AM   NOTE TO PHYSICIAN:  PLEASE COMPLETE THE ORDERS BELOW AND FAX TO   [facility]: [phone]  If you are unable to process this request in 24 hours please contact our office: [phone]    ___ I have read the above report and request that my patient continue as recommended.   ___ I have read the above report and request that my patient continue therapy with the following changes/special instructions:_________________________________________________________   ___ I have read the above report and request that my patient be discharged from therapy.      Physician Signature:        Date:       Time:

## 2019-04-12 ENCOUNTER — APPOINTMENT (OUTPATIENT)
Dept: PHYSICAL THERAPY | Age: 49
End: 2019-04-12
Payer: COMMERCIAL

## 2019-04-16 ENCOUNTER — HOSPITAL ENCOUNTER (OUTPATIENT)
Dept: PHYSICAL THERAPY | Age: 49
Discharge: HOME | End: 2019-04-16
Payer: COMMERCIAL

## 2019-04-16 PROCEDURE — 97110 THERAPEUTIC EXERCISES: CPT

## 2019-04-16 PROCEDURE — 97140 MANUAL THERAPY 1/> REGIONS: CPT

## 2019-04-16 NOTE — PROGRESS NOTES
PHYSICAL THERAPY - DAILY TREATMENT NOTE    Patient Name: Sharif Lawton        Date: 2019  : 1970   yes Patient  Verified  Visit #:   21   of   39  Insurance: Payor: Leslee Mathews / Plan: [address] PT / Product Type: Commerical /      In time: 10:05 Out time: 11:30   Total Treatment Time: 85     Medicare/[facility] Time Tracking (below)   Total Timed Codes (min):  na 1:1 Treatment Time:  na     TREATMENT AREA =  Lumbar spine pain [M54.5]  SUBJECTIVE  Pain Level (on 0 to 10 scale):  3  / 10   Medication Changes/New allergies or changes in medical history, any new surgeries or procedures?    no  If yes, update Summary List   Subjective Functional Status/Changes:  []  No changes reported     I was good for a day after the last visit and it started to hurt again. But its much better than it was last week. OBJECTIVE  30 min Therapeutic Exercise:  [x]  See flow sheet   Rationale:      increase ROM, increase strength and improve coordination to improve the patients ability to perform ADLs      35 min Manual Therapy: T/s mob, SHot gun tech, L5 ERS jimy, L on L Si correction,   Rationale:      decrease pain, increase ROM and increase tissue extensibility to improve patient's ability to perform ADLs     Dry Needling Procedure Note     Dry Needle Session Number:  6     Procedure:    An intramuscular manual therapy (dry needling) and a neuro-muscular re-education treatment was done to deactivate myofascial trigger points, with a 15/30 gauge solid filament needle, under aseptic technique.     Indication(s):        [x] Muscle spasms      [x] Myalgia/Myositis  [] Muscle cramps                                         [] Muscle imbalances         [] TMD (TMJ)          [] Myofascial pain & dysfunction                 [] Other: __     Chart reviewed for the following:  Gretta Lerma, have reviewed the medical history, summary list and precautions/contraindications for Zee Raza.     TIME OUT performed immediately prior to start of procedure:  10:15 (enter time the timeout was completed)  Rah Breaux, have performed the following reviews on Zee Raza prior to the start of the session:      [x] Patient was identified by name and date of birth    [x] Agreement on all muscles being treated was verified   [x] Purpose of dry needling, side effects, possible complications, risks and benefits were explained to the patient   [x] Procedure site(s) verified  [x] Patient was positioned for comfort and draped for privacy  [x] Informed Consent was signed (initial visit) and verified verbally (subsequent visits)  [x] Patient was instructed on the need to report the use of blood thinners and/or immunosuppressant medications  [x] How to respond to possible adverse effects of treatment  [x] Self treatment of post needling soreness: ice, heat (moist heat, heat wraps) and stretching  [x] Opportunity was given to ask any questions, all questions were answered     Treatment:  The following muscles were treated today:     Right: L/S para/multifidus,    Left: L/S para/multifidus, Psoas      Patients response to todays treatment:   [x]  LTRs               [x]  Muscle Relaxation                      []  Pain Relief                     []  Decreased Tinnitus  []  Decreased HAs           []  Post needling soreness  []  Increased ROM              []  Other:         Billed With/As:   [] TE   [] TA   [] Neuro   [] Self Care Patient Education: [x] Review HEP    [] Progressed/Changed HEP based on:   [] positioning   [] body mechanics   [] transfers   [] heat/ice application    [] other:      Other Objective/Functional Measures:    Cont to have sig limited t/s mobility noted.       Post Treatment Pain Level (on 0 to 10) scale:   1  / 10     ASSESSMENT  Assessment/Changes in Function:     Good cathy to all Rx without increase in pain      []  See Progress Note/Recertification   Patient will continue to benefit from skilled PT services to modify and progress therapeutic interventions, address functional mobility deficits, address ROM deficits and address strength deficits to attain remaining goals.    Progress toward goals / Updated goals:    Slow progress with pain reduction      PLAN  [x]  Upgrade activities as tolerated yes Continue plan of care   []  Discharge due to :    []  Other:      Therapist: Mery Escobedo PT    Date: 4/16/2019 Time: 10:00 AM     Future Appointments   Date Time Provider Geraldine Burrell   4/25/2019  9:30 AM Misti Manifold, PT [facility]   4/30/2019 11:00 AM Misti Manifold, PT [facility]   5/7/2019 10:00 AM Misti Manifold, PT [facility]   5/17/2019 10:00 AM Misti Manifold, PT [facility]   5/24/2019 10:00 AM Misti Manifold, PT [facility]

## 2019-04-18 ENCOUNTER — APPOINTMENT (OUTPATIENT)
Dept: PHYSICAL THERAPY | Age: 49
End: 2019-04-18
Payer: COMMERCIAL

## 2019-04-23 ENCOUNTER — APPOINTMENT (OUTPATIENT)
Dept: PHYSICAL THERAPY | Age: 49
End: 2019-04-23
Payer: COMMERCIAL

## 2019-04-25 ENCOUNTER — HOSPITAL ENCOUNTER (OUTPATIENT)
Dept: PHYSICAL THERAPY | Age: 49
Discharge: HOME | End: 2019-04-25
Payer: COMMERCIAL

## 2019-04-25 PROCEDURE — 97110 THERAPEUTIC EXERCISES: CPT

## 2019-04-25 PROCEDURE — 97140 MANUAL THERAPY 1/> REGIONS: CPT

## 2019-04-25 NOTE — PROGRESS NOTES
PHYSICAL THERAPY - DAILY TREATMENT NOTE    Patient Name: Cassi Allison        Date: 2019  : 1970   yes Patient  Verified  Visit #:   24   of   36  Insurance: Payor: Morene Rash / Plan: [address] PT / Product Type: Commerical /      In time: 9:40 Out time: 10:57   Total Treatment Time: 87     Medicare/[facility] Time Tracking (below)   Total Timed Codes (min):  na 1:1 Treatment Time:  na     TREATMENT AREA =  Lumbar spine pain [M54.5]  SUBJECTIVE  Pain Level (on 0 to 10 scale):  3  / 10   Medication Changes/New allergies or changes in medical history, any new surgeries or procedures?    no  If yes, update Summary List   Subjective Functional Status/Changes:  []  No changes reported     I actually feel pretty good. Not bad for the last week. OBJECTIVE  40 min Therapeutic Exercise:  [x]  See flow sheet   Rationale:      increase ROM, increase strength and improve coordination to improve the patients ability to perform ADLs      35 min Manual Therapy: T/s mob, SHot gun tech, L5 ERS jimy, L on L Si correction,   Rationale:      decrease pain, increase ROM and increase tissue extensibility to improve patient's ability to perform ADLs     Dry Needling Procedure Note     Dry Needle Session Number:  7     Procedure:    An intramuscular manual therapy (dry needling) and a neuro-muscular re-education treatment was done to deactivate myofascial trigger points, with a 15/30 gauge solid filament needle, under aseptic technique.     Indication(s):        [x] Muscle spasms      [x] Myalgia/Myositis  [] Muscle cramps                                         [] Muscle imbalances         [] TMD (TMJ)          [] Myofascial pain & dysfunction                 [] Other: __     Chart reviewed for the following:  Samantha Heart, have reviewed the medical history, summary list and precautions/contraindications for Zee Raza.     TIME OUT performed immediately prior to start of procedure:  9:45 (enter time the timeout was completed)  Waldo Mccollum, have performed the following reviews on Zee Raza prior to the start of the session:      [x] Patient was identified by name and date of birth    [x] Agreement on all muscles being treated was verified   [x] Purpose of dry needling, side effects, possible complications, risks and benefits were explained to the patient   [x] Procedure site(s) verified  [x] Patient was positioned for comfort and draped for privacy  [x] Informed Consent was signed (initial visit) and verified verbally (subsequent visits)  [x] Patient was instructed on the need to report the use of blood thinners and/or immunosuppressant medications  [x] How to respond to possible adverse effects of treatment  [x] Self treatment of post needling soreness: ice, heat (moist heat, heat wraps) and stretching  [x] Opportunity was given to ask any questions, all questions were answered     Treatment:  The following muscles were treated today:     Right: L/S para/multifidus,    Left: L/S para/multifidus, Psoas      Patients response to todays treatment:   [x]  LTRs               [x]  Muscle Relaxation                      []  Pain Relief                     []  Decreased Tinnitus  []  Decreased HAs           []  Post needling soreness  []  Increased ROM              []  Other:         Billed With/As:   [] TE   [] TA   [] Neuro   [] Self Care Patient Education: [x] Review HEP    [] Progressed/Changed HEP based on:   [] positioning   [] body mechanics   [] transfers   [] heat/ice application    [] other:      Other Objective/Functional Measures:    Cont to be limited at upper thoracic levels     Post Treatment Pain Level (on 0 to 10) scale:   2  / 10     ASSESSMENT  Assessment/Changes in Function:     Good cathy to all Rx without increase in pain      []  See Progress Note/Recertification   Patient will continue to benefit from skilled PT services to modify and progress therapeutic interventions, address functional mobility deficits, address ROM deficits and address strength deficits to attain remaining goals.    Progress toward goals / Updated goals:    Slowly progressing with pain reduction      PLAN  [x]  Upgrade activities as tolerated yes Continue plan of care   []  Discharge due to :    []  Other:      Therapist: Alan Irving PT    Date: 4/25/2019 Time: 6:32 AM     Future Appointments   Date Time Provider Geraldine Burrell   4/25/2019  9:30 AM Eber Eubanks, PT [facility]   4/30/2019 11:00 AM Eber Eubanks, PT [facility]   5/7/2019 10:00 AM Eber Eubanks, PT [facility]   5/17/2019 10:00 AM Eber Eubanks, PT [facility]   5/24/2019 10:00 AM Eber Eubanks, PT [facility]

## 2019-04-30 ENCOUNTER — HOSPITAL ENCOUNTER (OUTPATIENT)
Dept: PHYSICAL THERAPY | Age: 49
Discharge: HOME | End: 2019-04-30
Payer: COMMERCIAL

## 2019-04-30 PROCEDURE — 97140 MANUAL THERAPY 1/> REGIONS: CPT

## 2019-04-30 NOTE — PROGRESS NOTES
PHYSICAL THERAPY - DAILY TREATMENT NOTE    Patient Name: Woodward Blizzard        Date: 2019  : 1970   yes Patient  Verified  Visit #:   22   of   39  Insurance: Payor: Gael Mancera / Plan: [address] PT / Product Type: Commerical /      In time: 11:10 Out time: 11:40   Total Treatment Time: 30     Medicare/[facility] Time Tracking (below)   Total Timed Codes (min):  na 1:1 Treatment Time:  na     TREATMENT AREA =  Lumbar spine pain [M54.5]  SUBJECTIVE  Pain Level (on 0 to 10 scale):  3  / 10   Medication Changes/New allergies or changes in medical history, any new surgeries or procedures?    no  If yes, update Summary List   Subjective Functional Status/Changes:  []  No changes reported     Considering what phoebe done over the weekend, it has not been too bad. 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     Indication(s):        [x] Muscle spasms      [x] Myalgia/Myositis  [] Muscle cramps                                         [] Muscle imbalances         [] TMD (TMJ)          [] Myofascial pain & dysfunction                 [] Other: __     Chart reviewed for the following:  Bhavya Squires, have reviewed the medical history, summary list and precautions/contraindications for Zee Raza.     TIME OUT performed immediately prior to start of procedure:  1115 (enter time the timeout was completed)  Bhavya Squires, have performed the following reviews on Zee Raza prior to the start of the session:      [x] Patient was identified by name and date of birth    [x] Agreement on all muscles being treated was verified   [x] Purpose of dry needling, side effects, possible complications, risks and benefits were explained to the patient   [x] Procedure site(s) verified  [x] Patient was positioned for comfort and draped for privacy  [x] Informed Consent was signed (initial visit) and verified verbally (subsequent visits)  [x] Patient was instructed on the need to report the use of blood thinners and/or immunosuppressant medications  [x] How to respond to possible adverse effects of treatment  [x] Self treatment of post needling soreness: ice, heat (moist heat, heat wraps) and stretching  [x] Opportunity was given to ask any questions, all questions were answered     Treatment:  The following muscles were treated today:     Right: L/S para/multifidus,    Left: L/S para/multifidus, Psoas      Patients response to todays treatment:   [x]  LTRs               [x]  Muscle Relaxation                      []  Pain Relief                     []  Decreased Tinnitus  []  Decreased HAs           []  Post needling soreness  []  Increased ROM              []  Other:        Billed With/As:   [] TE   [] TA   [] Neuro   [] Self Care Patient Education: [x] Review HEP    [] Progressed/Changed HEP based on:   [] positioning   [] body mechanics   [] transfers   [] heat/ice application    [] other:      Other Objective/Functional Measures:    Cont to have limited upper thoracic mobility      Post Treatment Pain Level (on 0 to 10) scale:   1  / 10     ASSESSMENT  Assessment/Changes in Function:     Good cathy to all Rx without increase in pain     []  See Progress Note/Recertification   Patient will continue to benefit from skilled PT services to modify and progress therapeutic interventions, address functional mobility deficits and address ROM deficits to attain remaining goals.   Progress toward goals / Updated goals:    Slowly progressing with pain reduction     PLAN  [x]  Upgrade activities as tolerated yes Continue plan of care   []  Discharge due to :    []  Other:      Therapist: Ketan Saldivar PT    Date: 4/30/2019 Time: 9:46 AM     Future Appointments   Date Time Provider Geraldine Burrell   4/30/2019 11:00 AM Nathan Joy, PT [facility]   5/7/2019 10:00 AM Nathan Joy, PT [facility]   5/17/2019 10:00 AM Nathan Joy, PT [facility]   5/24/2019 10:00 AM Nathan Joy, PT [facility]

## 2019-05-07 ENCOUNTER — APPOINTMENT (OUTPATIENT)
Dept: PHYSICAL THERAPY | Age: 49
End: 2019-05-07
Payer: COMMERCIAL

## 2019-05-17 ENCOUNTER — HOSPITAL ENCOUNTER (OUTPATIENT)
Dept: PHYSICAL THERAPY | Age: 49
End: 2019-05-17
Payer: COMMERCIAL

## 2019-05-24 ENCOUNTER — HOSPITAL ENCOUNTER (OUTPATIENT)
Dept: PHYSICAL THERAPY | Age: 49
Discharge: HOME | End: 2019-05-24
Payer: COMMERCIAL

## 2019-05-24 PROCEDURE — 97140 MANUAL THERAPY 1/> REGIONS: CPT

## 2019-05-24 NOTE — PROGRESS NOTES
[address] [place] [address], [address] [place] [address] - Phone: [phone]  Fax: [phone]  DISCHARGE NOTE  Patient Name: Jeannie England : 1970   Treatment/Medical Diagnosis: Lumbar spine pain [M54.5]   Referral Source: Cristofer Dallas DO       Date of Initial Visit: 1/10/19 Attended Visits: 26 Missed Visits: 1      SUMMARY OF TREATMENT  Pt has been treated at this facility 2-3x/week for a total of 26 visits with treatment consisting of directional based exercises for rom and core stability, manual therapy (prom/mobs/dtm), IMT and modalities prn. In addition pt was instructed on hep  CURRENT STATUS  Due to scheduling conflicts pt has been able to attend 2 visits in the last month. However, she has made a good progress with her pain level and is now report max pain of 3/10. She continues to present with decreased upper thoracic mobility and complain of difficulty and increase in pain with bending and prolonged sitting. She did not return to PT treatment after the th visit.                    Goal/Measure of Progress Goal Met? 1.  Pt will increase LS arom within 25% in all directions to A with dressing   Status at last Eval: Flex 50%, l rot 60%, L sb 25%, ext 25% Current Status: L rotation/flex limited by 20% (full ROM after man Rx) progressing   2.  Pt will note daily max pain </=4/10 in order to increase participation in adls   Status at last Eval: 5/10 Current Status: 3/10 Yes   3.  Pt will be I with high level hep in order to prepare for d/c   Status at last Eval: na Current Status: na na      RECOMMENDATIONS  DC  With HEP  Specifics: Pt did not return to PT treatment after the 26th visit. If you have any questions/comments please contact us directly at [phone]. Thank you for allowing us to assist in the care of your patient.     Therapist Signature: Ana Apple DPT, OCS, SCS, CSCS Date: 19     Time: 10:54 AM

## 2019-05-24 NOTE — PROGRESS NOTES
PHYSICAL THERAPY - DAILY TREATMENT NOTE    Patient Name: Clyde Ortez        Date: 2019  : 1970   yes Patient  Verified  Visit #:   32   of   39  Insurance: Payor: Addie Herr / Plan: [address] PT / Product Type: Commerical /      In time: 11:10 Out time: 11:40   Total Treatment Time: 30     Medicare/[facility] Time Tracking (below)   Total Timed Codes (min):  na 1:1 Treatment Time:  na     TREATMENT AREA =  Lumbar spine pain [M54.5]  SUBJECTIVE  Pain Level (on 0 to 10 scale):  1-2  / 10   Medication Changes/New allergies or changes in medical history, any new surgeries or procedures?    no  If yes, update Summary List   Subjective Functional Status/Changes:  []  No changes reported     Its been pretty good. I think I turned a corner with this thing. I have been able to do more. It still hurts to sit for prolonged period of time and bend but its been about 3/10 at the worst.  I still get the tingling at my L thigh          OBJECTIVE  25 min Manual Therapy: T/s mob, SHot gun tech, L5 ERS jimy, L on L Si correction,   Rationale:      decrease pain, increase ROM and increase tissue extensibility to improve patient's ability to perform ADLs     Dry Needling Procedure Note     Dry Needle Session Number:  9     Procedure:    An intramuscular manual therapy (dry needling) and a neuro-muscular re-education treatment was done to deactivate myofascial trigger points, with a 15/30 gauge solid filament needle, under aseptic technique.     Indication(s):        [x] Muscle spasms      [x] Myalgia/Myositis  [] Muscle cramps                                         [] Muscle imbalances         [] TMD (TMJ)          [] Myofascial pain & dysfunction                 [] Other: __     Chart reviewed for the following:  Jany Duran, have reviewed the medical history, summary list and precautions/contraindications for Zee Raza.     TIME OUT performed immediately prior to start of procedure:  1115 (enter time the timeout was completed)  Franky Maciel, have performed the following reviews on Zee Raza prior to the start of the session:      [x] Patient was identified by name and date of birth    [x] Agreement on all muscles being treated was verified   [x] Purpose of dry needling, side effects, possible complications, risks and benefits were explained to the patient   [x] Procedure site(s) verified  [x] Patient was positioned for comfort and draped for privacy  [x] Informed Consent was signed (initial visit) and verified verbally (subsequent visits)  [x] Patient was instructed on the need to report the use of blood thinners and/or immunosuppressant medications  [x] How to respond to possible adverse effects of treatment  [x] Self treatment of post needling soreness: ice, heat (moist heat, heat wraps) and stretching  [x] Opportunity was given to ask any questions, all questions were answered     Treatment:  The following muscles were treated today:     Right: L/S para/multifidus,    Left: L/S para/multifidus, Psoas      Patients response to todays treatment:   [x]  LTRs               [x]  Muscle Relaxation                      []  Pain Relief                     []  Decreased Tinnitus  []  Decreased HAs           []  Post needling soreness  []  Increased ROM              []  Other:         Billed With/As:   [] TE   [] TA   [] Neuro   [] Self Care Patient Education: [x] Review HEP    [] Progressed/Changed HEP based on:   [] positioning   [] body mechanics   [] transfers   [] heat/ice application    [] other:      Other Objective/Functional Measures:  FOTO = 54  GROC = +4  Cont to be limited at end range L t/s ROt     Post Treatment Pain Level (on 0 to 10) scale:   3  / 10     ASSESSMENT  Assessment/Changes in Function:   Full Rot ROM noted after man Rx  C/o soreness after DN     []  See Progress Note/Recertification   Patient will continue to benefit from skilled PT services to modify and progress therapeutic interventions, address functional mobility deficits and address ROM deficits to attain remaining goals.    Progress toward goals / Updated goals:    Progressing well with pain      PLAN  [x]  Upgrade activities as tolerated yes Continue plan of care   []  Discharge due to :    []  Other:      Therapist: Michael aMrtinez PT    Date: 5/24/2019 Time: 6:30 AM     Future Appointments   Date Time Provider Geraldine Burrell   5/24/2019 10:00 AM Rupinder Brennan, PT [facility]

## 2019-06-12 ENCOUNTER — HOSPITAL ENCOUNTER (OUTPATIENT)
Dept: PHYSICAL THERAPY | Age: 49
End: 2019-06-12

## 2019-08-13 ENCOUNTER — HOSPITAL ENCOUNTER (OUTPATIENT)
Dept: PHYSICAL THERAPY | Age: 49
Discharge: HOME | End: 2019-08-13
Payer: COMMERCIAL

## 2019-08-13 PROCEDURE — 97140 MANUAL THERAPY 1/> REGIONS: CPT

## 2019-08-13 PROCEDURE — 97535 SELF CARE MNGMENT TRAINING: CPT

## 2019-08-13 PROCEDURE — 97162 PT EVAL MOD COMPLEX 30 MIN: CPT

## 2019-08-13 NOTE — PROGRESS NOTES
PHYSICAL THERAPY - DAILY TREATMENT NOTE    Patient Name: Celso Maurer        Date: 2019  : 1970   YES Patient  Verified  Visit #:     Insurance: Payor: Barbara Bonilla / Plan: [address] [place] [address] Ronald PT / Product Type: Commerical /      In time: 3:10 Out time: 3:50   Total Treatment Time: 40     Medicare Time Tracking (below)   Total Timed Codes (min):  NA 1:1 Treatment Time:  NA     TREATMENT AREA =  Low back pain [M54.5]    SUBJECTIVE  Pain Level (on 0 to 10 scale):    Medication Changes/New allergies or changes in medical history, any new surgeries or procedures? NO    If yes, update Summary List   Subjective Functional Status/Changes:  []  No changes reported     SEE IE          OBJECTIVE    12 min Manual Therapy: T/S Mob, shot gun tech, R IS ant inn jimy, L4-5 L ERS, L on L SI jimy   Rationale:      decrease pain, increase ROM and increase tissue extensibility to improve patient's ability to perform ADLs    8 min Self Care: T/s ROt and self t/s mob   Rationale:    increase ROM, increase strength and improve coordination to improve the patients ability to perform ADLs      Billed With/As:   [] TE   [] TA   [] Neuro   [x] Self Care Patient Education: [x] Review HEP    [] Progressed/Changed HEP based on:   [] positioning   [] body mechanics   [] transfers   [] heat/ice application    [] other:       min Patient Education:  YES  Reviewed HEP   []  Progressed/Changed HEP based on:         Other Objective/Functional Measures:    SEE IE     Post Treatment Pain Level (on 0 to 10) scale:       ASSESSMENT  Assessment/Changes in Function:     SEE IE     []  See Progress Note/Recertification   Patient will continue to benefit from skilled PT services to modify and progress therapeutic interventions, address functional mobility deficits, address ROM deficits, address strength deficits, analyze and address soft tissue restrictions, analyze and cue movement patterns, analyze and modify body mechanics/ergonomics and assess and modify postural abnormalities to attain remaining goals. Progress toward goals / Updated goals:         PLAN  []  Upgrade activities as tolerated YES Continue plan of care   []  Discharge due to :    []  Other:      Therapist: Rachel Mueller, PT, OCS, SCS, CSCS    Date: 8/13/2019 Time: 3:54 PM       No future appointments.

## 2019-08-13 NOTE — PROGRESS NOTES
[address] [place] [address], [address] - Phone: [phone]  Fax: [id number] OF CARE / [phone]  Patient Name: Jyoti Rea : 1970   Medical   Diagnosis: Low back pain [M54.5] Treatment Diagnosis: Low back pain [M54.5]   Onset Date: chronic     Referral Source: Mayra Kelley DO Start of Care [facility]): 2019   Prior Hospitalization: See medical history Provider #: [id number]   Prior Level of Function: Chronic Hx lf LBP   Comorbidities: none   Medications: Verified on Patient Summary List   The Plan of Care and following information is based on the information from the initial evaluation.   ===========================================================================================  Assessment / key information:  Jyoti Rea is a 57 y.o.  yo female with Dx of Low back pain [M54.5]. She reports chronic Hx of LBP. She currently rates her pain as 9/10 at worst, 3/10 at best, primarily located at lower lumbar region as well as upper buttock region. She complains of difficulty and increase pain with bending and prolonged activities. Objective Findings:  Lumbar ROM: Flx  = limited by 40%, Ext = limited by 40%, Rot: R = limited by 60%, L = limited by 30%. Manual Muscle Testing:   L hip abd and ext are Reduced. Lumbar/ SI Screening:  R iliosacral ant innominate, L4-5 L ERS.   Palpation:  Limited mobility noted at upper to mid thoracic and increased soft tissue tension noted at L GmPt instructed in HEP and will f/u in clinic for PT.  ===========================================================================================  Eval Complexity: History LOW Complexity : Zero comorbidities / personal factors that will impact the outcome / POC;  Examination  MEDIUM Complexity : 3 Standardized tests and measures addressing body structure, function, activity limitation and / or participation in recreation ; Presentation MEDIUM Complexity : Evolving with changing characteristics ; Decision Making MEDIUM Complexity : FOTO score of 26-74; Overall Complexity MEDIUM  Problem List: pain affecting function, decrease ROM, decrease strength, decrease ADL/ functional abilitiies, decrease activity tolerance and decrease flexibility/ joint mobility   Treatment Plan may include any combination of the following: Therapeutic exercise, Therapeutic activities, Neuromuscular re-education, Physical agent/modality, Manual therapy, Patient education, Self Care training and Functional mobility training  Patient / Family readiness to learn indicated by: asking questions  Persons(s) to be included in education: patient (P)  Barriers to Learning/Limitations: None  Measures taken, if barriers to learning:    Patient Goal (s): Decrease pain    Patient self reported health status: good  Rehabilitation Potential: good     Short Term Goals: To be accomplished in  1-2  weeks:  1. Independent with HEP. 2. Decrease max pain 25-50% to assist with ADLs   Long Term Goals: To be accomplished in  3-4  weeks:  1. Decrease max pain 50-75% to assist with ADLs  2. Increase FOTO score tby 10% to show functional improvment. 3.  Will rate  >/= +5 on Global Rating of Change and be prepared to DC to HEP. Frequency / Duration:   Patient to be seen  2-3  times per week for 3-4  weeks:  Patient / Caregiver education and instruction: self care and exercises    Therapist Signature: Izabela Tan DPT, OCS, SCS, CSCS Date: 4/95/2804   Certification Period: na Time: 3:56 PM   ===========================================================================================  I certify that the above Physical Therapy Services are being furnished while the patient is under my care. I agree with the treatment plan and certify that this therapy is necessary.     Physician Signature:        Date:       Time:     Please sign and return to [facility] or you may fax the signed copy to [phone]. Thank you.

## 2019-08-20 ENCOUNTER — HOSPITAL ENCOUNTER (OUTPATIENT)
Dept: PHYSICAL THERAPY | Age: 49
Discharge: HOME | End: 2019-08-20
Payer: COMMERCIAL

## 2019-08-20 PROCEDURE — 97110 THERAPEUTIC EXERCISES: CPT

## 2019-08-20 PROCEDURE — 97140 MANUAL THERAPY 1/> REGIONS: CPT

## 2019-08-20 NOTE — PROGRESS NOTES
PHYSICAL THERAPY - DAILY TREATMENT NOTE    Patient Name: Kole Vega        Date: 2019  : 1970   yes Patient  Verified  Visit #:     Insurance: Payor: Ton Dillon / Plan: [address] [place], [place] [address] / Product Type: Commerical /      In time: 5:40 Out time: 6:40   Total Treatment Time: 60     Medicare/[facility] Time Tracking (below)   Total Timed Codes (min):  na 1:1 Treatment Time:  na     TREATMENT AREA =  Low back pain [M54.5]  SUBJECTIVE  Pain Level (on 0 to 10 scale):   10   Medication Changes/New allergies or changes in medical history, any new surgeries or procedures?    no  If yes, update Summary List   Subjective Functional Status/Changes:  []  No changes reported     Better than the other day          OBJECTIVE      30 min Therapeutic Exercise:  [x]  See flow sheet   Rationale:      increase ROM, increase strength and improve coordination to improve the patients ability to perform ADLs     30 min Manual Therapy: T/S Mob, shot gun tech,IASTM L UT, c/s t/s para, 5 L ERS, L on L SI jimy   Rationale:      decrease pain, increase ROM and increase tissue extensibility to improve patient's ability to perform ADLs    Billed With/As:   [] TE   [] TA   [] Neuro   [] Self Care Patient Education: [x] Review HEP    [] Progressed/Changed HEP based on:   [] positioning   [] body mechanics   [] transfers   [] heat/ice application    [] other:      Other Objective/Functional Measures:    Cont to present with t/s ROt     Post Treatment Pain Level (on 0 to 10) scale:   3  / 10     ASSESSMENT  Assessment/Changes in Function:     Near full c/s, t/s ROM noted after man Rx     []  See Progress Note/Recertification   Patient will continue to benefit from skilled PT services to modify and progress therapeutic interventions, address functional mobility deficits and address ROM deficits to attain remaining goals. Progress toward goals / Updated goals:     Independent with HEP     PLAN  [x]  Upgrade activities as tolerated yes Continue plan of care   []  Discharge due to :    []  Other:      Therapist: Anant Vega PT    Date: 8/20/2019 Time: 10:04 AM     Future Appointments   Date Time Provider Geraldine Burrell   8/20/2019  5:30 PM Catherine Juan, PT [facility]   8/28/2019  8:30 AM Catherine Juan, PT [facility]   9/5/2019  9:00 AM Catherine Juan, PT [facility]

## 2019-08-28 ENCOUNTER — HOSPITAL ENCOUNTER (OUTPATIENT)
Dept: PHYSICAL THERAPY | Age: 49
Discharge: HOME | End: 2019-08-28
Payer: COMMERCIAL

## 2019-08-28 PROCEDURE — 97140 MANUAL THERAPY 1/> REGIONS: CPT

## 2019-08-28 PROCEDURE — 97110 THERAPEUTIC EXERCISES: CPT

## 2019-08-28 NOTE — PROGRESS NOTES
PHYSICAL THERAPY - DAILY TREATMENT NOTE    Patient Name: Trang Pichardo        Date: 2019  : 1970   yes Patient  Verified  Visit #:   3   of   12  Insurance: Payor: [employer] / Plan: [address] PT / Product Type: Commerical /      In time: 8:40 Out time: 9:30   Total Treatment Time: 50     Medicare/[employer] Time Tracking (below)   Total Timed Codes (min):  na 1:1 Treatment Time:  na     TREATMENT AREA =  Low back pain [M54.5]  SUBJECTIVE  Pain Level (on 0 to 10 scale):  4  / 10   Medication Changes/New allergies or changes in medical history, any new surgeries or procedures?    no  If yes, update Summary List   Subjective Functional Status/Changes:  []  No changes reported     Better than first week but im hurting a lot in midback           OBJECTIVE  30 min Therapeutic Exercise:  [x]  See flow sheet   Rationale:      increase ROM, increase strength and improve coordination to improve the patients ability to perform ADLs      16 min Manual Therapy: T/S Mob, shot gun tech, 5 L ERS, L on L SI jimy   Rationale:      decrease pain, increase ROM and increase tissue extensibility to improve patient's ability to perform ADLs  Dry Needling Procedure Note    Dry Needle Session Number:  1    Procedure: An intramuscular manual therapy (dry needling) and a neuro-muscular re-education treatment was done to deactivate myofascial trigger points, with a 15/30 gauge solid filament needle, under aseptic technique. Indication(s): [x] Muscle spasms [] Myalgia/Myositis  [] Muscle cramps      [] Muscle imbalances [] TMD (TMJ) [] Myofascial pain & dysfunction     [] Other: __    Chart reviewed for the following:  Daniela MEANS PT, have reviewed the medical history, summary list and precautions/contraindications for Trang Pichardo.     TIME OUT performed immediately prior to start of procedure:  8:43 (enter time the timeout was completed)  Daniela MEANS PT, have performed the following reviews on Zee Raza prior to the start of the session:      [x] Patient was identified by name and date of birth    [x] Agreement on all muscles being treated was verified   [x] Purpose of dry needling, side effects, possible complications, risks and benefits were explained to the patient   [x] Procedure site(s) verified  [x] Patient was positioned for comfort and draped for privacy  [x] Informed Consent was signed (initial visit) and verified verbally (subsequent visits)  [x] Patient was instructed on the need to report the use of blood thinners and/or immunosuppressant medications  [x] How to respond to possible adverse effects of treatment  [x] Self treatment of post needling soreness: ice, heat (moist heat, heat wraps) and stretching  [x] Opportunity was given to ask any questions, all questions were answered            Treatment:  The following muscles were treated today:    Right: T/s para   Left: T/s para     Patients response to todays treatment:   [x]  LTRs  [x]  Muscle Relaxation  []  Pain Relief  []  Decreased Tinnitus  []  Decreased HAs []  Post needling soreness []  Increased ROM   []  Other:      Actual needle insertion time is not billed       Billed With/As:   [] TE   [] TA   [] Neuro   [] Self Care Patient Education: [x] Review HEP    [] Progressed/Changed HEP based on:   [] positioning   [] body mechanics   [] transfers   [] heat/ice application    [] other:      Other Objective/Functional Measures:    Cont to be limited in t/s ext and Rot     Post Treatment Pain Level (on 0 to 10) scale:   3  / 10     ASSESSMENT  Assessment/Changes in Function:     Near full B t/s rot noted after man Rx     []  See Progress Note/Recertification   Patient will continue to benefit from skilled PT services to modify and progress therapeutic interventions, address functional mobility deficits and address ROM deficits to attain remaining goals.    Progress toward goals / Updated goals:    No sig change towards LTGs today     PLAN  [x]  Upgrade activities as tolerated yes Continue plan of care   []  Discharge due to :    []  Other:      Therapist: Bindu Garcia PT    Date: 8/28/2019 Time: 6:22 AM     Future Appointments   Date Time Provider Geraldine Burrell   8/28/2019  8:30 AM Mila Catalan, [facility]   9/5/2019  9:00 AM Mila Catalan, PT [facility]

## 2019-09-05 ENCOUNTER — HOSPITAL ENCOUNTER (OUTPATIENT)
Dept: PHYSICAL THERAPY | Age: 49
Discharge: HOME | End: 2019-09-05
Payer: COMMERCIAL

## 2019-09-05 PROCEDURE — 97140 MANUAL THERAPY 1/> REGIONS: CPT

## 2019-09-05 NOTE — PROGRESS NOTES
PHYSICAL THERAPY - DAILY TREATMENT NOTE    Patient Name: Ricardo Joe        Date: 2019  : 1970   yes Patient  Verified  Visit #:   4   of   12  Insurance: Payor: Tamela Liu / Plan: Alessandro Koo PT / Product Type: Commerical /      In time: 9:10 Out time: 9:30   Total Treatment Time: 20     Medicare/[facility] Time Tracking (below)   Total Timed Codes (min):  na 1:1 Treatment Time:  na     TREATMENT AREA =  Low back pain [M54.5]  SUBJECTIVE  Pain Level (on 0 to 10 scale):  2  / 10   Medication Changes/New allergies or changes in medical history, any new surgeries or procedures?    no  If yes, update Summary List   Subjective Functional Status/Changes:  []  No changes reported     So much better than last week. Just a little HA          OBJECTIVE     16 min Manual Therapy: T/S Mob, shot gun tech, 5 L ERS, L on L SI jimy   Rationale:      decrease pain, increase ROM and increase tissue extensibility to improve patient's ability to perform ADLs  Dry Needling Procedure Note     Dry Needle Session Number:  2     Procedure:    An intramuscular manual therapy (dry needling) and a neuro-muscular re-education treatment was done to deactivate myofascial trigger points, with a 15/30 gauge solid filament needle, under aseptic technique.     Indication(s):        [x] Muscle spasms      [] Myalgia/Myositis  [] Muscle cramps                                         [] Muscle imbalances         [] TMD (TMJ)          [] Myofascial pain & dysfunction                 [] Other: __     Chart reviewed for the following:  Sherlyn MEANS, PT, have reviewed the medical history, summary list and precautions/contraindications for Ricardo Joe.     TIME OUT performed immediately prior to start of procedure:  9:14(enter time the timeout was completed)  Sherlyn MEANS, PT, have performed the following reviews on Zee Raza prior to the start of the session:      [x] Patient was identified by name and date of birth    [x] Agreement on all muscles being treated was verified   [x] Purpose of dry needling, side effects, possible complications, risks and benefits were explained to the patient   [x] Procedure site(s) verified  [x] Patient was positioned for comfort and draped for privacy  [x] Informed Consent was signed (initial visit) and verified verbally (subsequent visits)  [x] Patient was instructed on the need to report the use of blood thinners and/or immunosuppressant medications  [x] How to respond to possible adverse effects of treatment  [x] Self treatment of post needling soreness: ice, heat (moist heat, heat wraps) and stretching  [x] Opportunity was given to ask any questions, all questions were answered     Treatment:  The following muscles were treated today:     Right: T/s para, suboccipitals   Left: T/s para, suboccipitals      Patients response to todays treatment:   [x]  LTRs               [x]  Muscle Relaxation                      []  Pain Relief                     []  Decreased Tinnitus  []  Decreased HAs           []  Post needling soreness  []  Increased ROM              []  Other:        Actual needle insertion time is not billed          Billed With/As:   [] TE   [] TA   [] Neuro   [] Self Care Patient Education: [x] Review HEP    [] Progressed/Changed HEP based on:   [] positioning   [] body mechanics   [] transfers   [] heat/ice application    [] other:      Other Objective/Functional Measures:    Cont to have increased soft tissue tension noted at B suboccipitals     Post Treatment Pain Level (on 0 to 10) scale:   1  / 10     ASSESSMENT  Assessment/Changes in Function:     Full c/s ROM noted after man Rx     []  See Progress Note/Recertification   Patient will continue to benefit from skilled PT services to modify and progress therapeutic interventions, address functional mobility deficits and address ROM deficits to attain remaining goals.    Progress toward goals / Updated goals:    Progressing with pain reduction     PLAN  [x]  Upgrade activities as tolerated yes Continue plan of care   []  Discharge due to :    []  Other:      Therapist: Charity Israel PT    Date: 9/5/2019 Time: 6:38 AM     Future Appointments   Date Time Provider Geraldine Burrell   9/5/2019  9:00 AM Lauren Madden PT [facility]

## 2019-09-25 ENCOUNTER — HOSPITAL ENCOUNTER (OUTPATIENT)
Dept: PHYSICAL THERAPY | Age: 49
Discharge: HOME | End: 2019-09-25
Payer: COMMERCIAL

## 2019-09-25 PROCEDURE — 97140 MANUAL THERAPY 1/> REGIONS: CPT

## 2019-09-25 NOTE — PROGRESS NOTES
PHYSICAL THERAPY - DAILY TREATMENT NOTE    Patient Name: Scott Mar        Date: 2019  : 1970   yes Patient  Verified  Visit #:      of   12  Insurance: Payor: Steven Cabrera / Plan: [address] PT / Product Type: Commerical /      In time: 9:05 Out time: 9:25   Total Treatment Time: 20     Medicare/[facility] Time Tracking (below)   Total Timed Codes (min):  na 1:1 Treatment Time:  na     TREATMENT AREA =  Low back pain [M54.5]  SUBJECTIVE  Pain Level (on 0 to 10 scale):  2  / 10   Medication Changes/New allergies or changes in medical history, any new surgeries or procedures?    no  If yes, update Summary List   Subjective Functional Status/Changes:  []  No changes reported     I got a massage and chiro practic treatment. I have been able to maintain for the most part. I just started to feel the pain coming. OBJECTIVE  16 min Manual Therapy: T/S Mob, shot gun tech, HVLA CT junction   Rationale:      decrease pain, increase ROM and increase tissue extensibility to improve patient's ability to perform ADLs  Dry Needling Procedure Note     Dry Needle Session Number:  3     Procedure:    An intramuscular manual therapy (dry needling) and a neuro-muscular re-education treatment was done to deactivate myofascial trigger points, with a 15/30 gauge solid filament needle, under aseptic technique.     Indication(s):        [x] Muscle spasms      [] Myalgia/Myositis  [] Muscle cramps                                         [] Muscle imbalances         [] TMD (TMJ)          [] Myofascial pain & dysfunction                 [] Other: __     Chart reviewed for the following:  IMic PT, have reviewed the medical history, summary list and precautions/contraindications for Zee Raza.     TIME OUT performed immediately prior to start of procedure:  9:14(enter time the timeout was completed)  Raleigh Butcher PT, have performed the following reviews on Zee Ry prior to the start of the session:      [x] Patient was identified by name and date of birth    [x] Agreement on all muscles being treated was verified   [x] Purpose of dry needling, side effects, possible complications, risks and benefits were explained to the patient   [x] Procedure site(s) verified  [x] Patient was positioned for comfort and draped for privacy  [x] Informed Consent was signed (initial visit) and verified verbally (subsequent visits)  [x] Patient was instructed on the need to report the use of blood thinners and/or immunosuppressant medications  [x] How to respond to possible adverse effects of treatment  [x] Self treatment of post needling soreness: ice, heat (moist heat, heat wraps) and stretching  [x] Opportunity was given to ask any questions, all questions were answered     Treatment:  The following muscles were treated today:     Right: T/s para, suboccipitals   Left: T/s para, suboccipitals      Patients response to todays treatment:   [x]  LTRs               [x]  Muscle Relaxation                      []  Pain Relief                     []  Decreased Tinnitus  []  Decreased HAs           []  Post needling soreness  []  Increased ROM              []  Other:        Actual needle insertion time is not billed       Billed With/As:   [] TE   [] TA   [] Neuro   [] Self Care Patient Education: [x] Review HEP    [] Progressed/Changed HEP based on:   [] positioning   [] body mechanics   [] transfers   [] heat/ice application    [] other:      Other Objective/Functional Measures:    Cont to be limited in R t/s Rot     Post Treatment Pain Level (on 0 to 10) scale:   1  / 10     ASSESSMENT  Assessment/Changes in Function:     Symmetrical Rot ROM noted after man Rx     []  See Progress Note/Recertification   Patient will continue to benefit from skilled PT services to modify and progress therapeutic interventions, address functional mobility deficits and address ROM deficits to attain remaining goals.    Progress toward goals / Updated goals:    Slowly progressing with pain reduction      PLAN  [x]  Upgrade activities as tolerated yes Continue plan of care   []  Discharge due to :    []  Other:      Therapist: Faith Charles PT    Date: 9/25/2019 Time: 6:23 AM     Future Appointments   Date Time Provider Geraldine Burrell   9/25/2019  9:00 AM Denice Gama PT [facility]   10/8/2019 10:30 AM Denice Gama, PT [facility]   10/29/2019 10:00 AM Denice Gama, PT [facility]

## 2019-10-08 ENCOUNTER — HOSPITAL ENCOUNTER (OUTPATIENT)
Dept: PHYSICAL THERAPY | Age: 49
Discharge: HOME | End: 2019-10-08
Payer: COMMERCIAL

## 2019-10-08 PROCEDURE — 97140 MANUAL THERAPY 1/> REGIONS: CPT

## 2019-10-08 NOTE — PROGRESS NOTES
PHYSICAL THERAPY - DAILY TREATMENT NOTE    Patient Name: Raoul Hernandez        Date: 10/8/2019  : 1970   yes Patient  Verified  Visit #:     Insurance: Payor: Augustin Abbasi / Plan: [address] PT / Product Type: Commerical /      In time: 10:35 Out time: 10:55   Total Treatment Time: 20     Medicare/[facility] Time Tracking (below)   Total Timed Codes (min):  na 1:1 Treatment Time:  na     TREATMENT AREA =  Low back pain [M54.5]  SUBJECTIVE  Pain Level (on 0 to 10 scale):  1  / 10   Medication Changes/New allergies or changes in medical history, any new surgeries or procedures?    no  If yes, update Summary List   Subjective Functional Status/Changes:  []  No changes reported     This is the best few weeks phoebe had.  3/10 at the worst in the last few weeks. OBJECTIVE  16 min Manual Therapy: T/S Mob, shot gun tech, HVLA CT junction   Rationale:      decrease pain, increase ROM and increase tissue extensibility to improve patient's ability to perform ADLs  Dry Needling Procedure Note     Dry Needle Session Number:  4     Procedure:    An intramuscular manual therapy (dry needling) and a neuro-muscular re-education treatment was done to deactivate myofascial trigger points, with a 15/30 gauge solid filament needle, under aseptic technique.     Indication(s):        [x] Muscle spasms      [] Myalgia/Myositis  [] Muscle cramps                                         [] Muscle imbalances         [] TMD (TMJ)          [] Myofascial pain & dysfunction                 [] Other: __     Chart reviewed for the following:  IMic PT, have reviewed the medical history, summary list and precautions/contraindications for Zee Inderopoulos.     TIME OUT performed immediately prior to start of procedure:  10:39(enter time the timeout was completed)  Mic MEANS PT, have performed the following reviews on Zee Inderopoulos prior to the start of the session:      [x] Patient was identified by name and date of birth    [x] Agreement on all muscles being treated was verified   [x] Purpose of dry needling, side effects, possible complications, risks and benefits were explained to the patient   [x] Procedure site(s) verified  [x] Patient was positioned for comfort and draped for privacy  [x] Informed Consent was signed (initial visit) and verified verbally (subsequent visits)  [x] Patient was instructed on the need to report the use of blood thinners and/or immunosuppressant medications  [x] How to respond to possible adverse effects of treatment  [x] Self treatment of post needling soreness: ice, heat (moist heat, heat wraps) and stretching  [x] Opportunity was given to ask any questions, all questions were answered     Treatment:  The following muscles were treated today:     Right: T/s para, suboccipitals   Left: T/s para, suboccipitals      Patients response to todays treatment:   [x]  LTRs               [x]  Muscle Relaxation                      []  Pain Relief                     []  Decreased Tinnitus  []  Decreased HAs           []  Post needling soreness  []  Increased ROM              []  Other:        Actual needle insertion time is not billed          Billed With/As:   [] TE   [] TA   [] Neuro   [] Self Care Patient Education: [x] Review HEP    [] Progressed/Changed HEP based on:   [] positioning   [] body mechanics   [] transfers   [] heat/ice application    [] other:      Other Objective/Functional Measures:  Cont to be limited in R t/s ROt  FOTO = 65  GROC = +5   Post Treatment Pain Level (on 0 to 10) scale:   0  / 10     ASSESSMENT  Assessment/Changes in Function:     Full t/s Rom noted after the man Rx     []  See Progress Note/Recertification   Patient will continue to benefit from skilled PT services to modify and progress therapeutic interventions, address functional mobility deficits and address ROM deficits to attain remaining goals.    Progress toward goals / Updated goals:    Progressing well with pain reduction     PLAN  [x]  Upgrade activities as tolerated yes Continue plan of care   []  Discharge due to :    []  Other:      Therapist: Julia Turcios PT    Date: 10/8/2019 Time: 9:48 AM     Future Appointments   Date Time Provider Geraldine Burrell   10/8/2019 10:30 AM Beltran Renee, PT [facility]   10/29/2019 10:00 AM Beltran Renee PT [facility]

## 2019-10-08 NOTE — PROGRESS NOTES
[address] [place] [address], [address] [place] [address] - Phone: [phone]  Fax: [phone]  PROGRESS NOTE  Patient Name: Petra Hameed : 1970   Treatment/Medical Diagnosis: Low back pain [M54.5]   Referral Source: Jb Reyes DO     Date of Initial Visit: 19 Attended Visits: 6 Missed Visits: 0     SUMMARY OF TREATMENT  Petra Hameed has been seen at our clinic  for a total of 6 visits. Pt treatment has consisted of  therapeutic exercise for thoraic ROM, hip/core strengthening, and manual therapy(jt mobilization and deep tissue mobilization/dry needling)  CURRENT STATUS  Pt has had a good tolerance to physical therapy treatment. She reports significantly decreased level of pain and now rates her worst pain as 3/10/  She continues to present with limited upper and mid thoracic region and complain of increase in pain after prolonged sitting and a long day of surgery. Goal/Measure of Progress Goal Met? 1. Decrease Max pain by 50-75% to assist with ADLs   Status at last Eval: 9/10 Current Status: 3/10 yes   2. Increase FOTO score by 10 % show functional improvement   Status at last Eval: 39% Current Status: 65% yes   3. Will rate >/= +5 on Global Rating of Change and be prepared to DC to HEP. Status at last Eval: na Current Status: +5 yes     New Goals to be achieved in __4__  weeks:  1. Pt will be independent with advanced HEP in preparation for DC   2.     3.     RECOMMENDATIONS    Specifics: 1x/wk x 4 more wks  If you have any questions/comments please contact us directly at [phone]. Thank you for allowing us to assist in the care of your patient.     Therapist Signature: Angeles Jose, DPT, OCS, SCS, CSCS Date: 10/8/2019     Time: 1:30 PM   NOTE TO PHYSICIAN:  PLEASE COMPLETE THE ORDERS BELOW AND FAX TO   [facility]: [phone]  If you are unable to process this request in 69 hours please contact our office: [phone]    ___ I have read the above report and request that my patient continue as recommended.   ___ I have read the above report and request that my patient continue therapy with the following changes/special instructions:_________________________________________________________   ___ I have read the above report and request that my patient be discharged from therapy.      Physician Signature:        Date:       Time:

## 2019-10-29 ENCOUNTER — APPOINTMENT (OUTPATIENT)
Dept: PHYSICAL THERAPY | Age: 49
End: 2019-10-29
Payer: COMMERCIAL

## 2019-11-08 ENCOUNTER — HOSPITAL ENCOUNTER (OUTPATIENT)
Dept: PHYSICAL THERAPY | Age: 49
End: 2019-11-08
Payer: COMMERCIAL

## 2019-11-26 ENCOUNTER — HOSPITAL ENCOUNTER (OUTPATIENT)
Dept: PHYSICAL THERAPY | Age: 49
Discharge: HOME | End: 2019-11-26
Payer: COMMERCIAL

## 2019-11-26 PROCEDURE — 97140 MANUAL THERAPY 1/> REGIONS: CPT

## 2019-11-26 NOTE — PROGRESS NOTES
PHYSICAL THERAPY - DAILY TREATMENT NOTE    Patient Name: Qi Chaney        Date: 2019  : 1970   yes Patient  Verified  Visit #:     Insurance: Payor: Mayo Duran / Plan: [address] PT / Product Type: Commerical /      In time: 11:05 Out time: 11:25   Total Treatment Time: 20     Medicare/[facility] Time Tracking (below)   Total Timed Codes (min):  na 1:1 Treatment Time:  na     TREATMENT AREA =  Low back pain [M54.5]  SUBJECTIVE  Pain Level (on 0 to 10 scale):  2  / 10   Medication Changes/New allergies or changes in medical history, any new surgeries or procedures?    no  If yes, update Summary List   Subjective Functional Status/Changes:  []  No changes reported     My LB has been doing good. Just a little tight and sore at the mid back           OBJECTIVE  16 min Manual Therapy: T/S Mob, shot gun tech, HVLA CT junction   Rationale:      decrease pain, increase ROM and increase tissue extensibility to improve patient's ability to perform ADLs  Dry Needling Procedure Note     Dry Needle Session Number:  5     Procedure:    An intramuscular manual therapy (dry needling) and a neuro-muscular re-education treatment was done to deactivate myofascial trigger points, with a 15/30 gauge solid filament needle, under aseptic technique.     Indication(s):        [x]? Muscle spasms      []? Myalgia/Myositis  []? Muscle cramps                                         []? Muscle imbalances         []? TMD (TMJ)          []? Myofascial pain & dysfunction                 []? Other: __     Chart reviewed for the following:  Mic MEANS PT, have reviewed the medical history, summary list and precautions/contraindications for Zee Pantojadevontenura.     TIME OUT performed immediately prior to start of procedure:  11:04(enter time the timeout was completed)  Mic MEANS PT, have performed the following reviews on Zee Inderopoulos prior to the start of the session:      [x]? Patient was identified by name and date of birth    [x]? Agreement on all muscles being treated was verified   [x]? Purpose of dry needling, side effects, possible complications, risks and benefits were explained to the patient   [x]? Procedure site(s) verified  [x]? Patient was positioned for comfort and draped for privacy  [x]? Informed Consent was signed (initial visit) and verified verbally (subsequent visits)  [x]?  Patient was instructed on the need to report the use of blood thinners and/or immunosuppressant medications  [x]? How to respond to possible adverse effects of treatment  [x]?  Self treatment of post needling soreness: ice, heat (moist heat, heat wraps) and stretching  [x]?  Opportunity was given to ask any questions, all questions were answered     Treatment:  The following muscles were treated today:     Right: T/s para,    Left: T/s para,       Patients response to todays treatment:   [x]?  LTRs               [x]?  Muscle Relaxation                      []?  Pain Relief                     []?  Decreased Tinnitus  []?  Decreased HAs           []?  Post needling soreness  []?  Increased ROM              []?  Other:        Actual needle insertion time is not billed          Billed With/As:   [] TE   [] TA   [] Neuro   [] Self Care Patient Education: [x] Review HEP    [] Progressed/Changed HEP based on:   [] positioning   [] body mechanics   [] transfers   [] heat/ice application    [] other:      Other Objective/Functional Measures:    Cont to have limited in B t/s Rot by 30%     Post Treatment Pain Level (on 0 to 10) scale:   0  / 10     ASSESSMENT  Assessment/Changes in Function:     Full t/s Rot noted after man Rx     []  See Progress Note/Recertification   Patient will continue to benefit from skilled PT services to modify and progress therapeutic interventions, address functional mobility deficits and address ROM deficits to attain remaining goals.    Progress toward goals / Updated goals:    Progressing well with pain reduction      PLAN  [x]  Upgrade activities as tolerated yes Continue plan of care   []  Discharge due to :    []  Other:      Therapist: Kit Leone PT    Date: 11/26/2019 Time: 9:52 AM     Future Appointments   Date Time Provider Geraldine Burrell   11/26/2019 11:30 AM Lynette Osorio, PT [facility]

## 2020-01-06 NOTE — PROGRESS NOTES
[address] [place], [place] [address]Sh[place]ita [address], [facility] - Phone: [phone]  Fax: [phone]  DISCHARGE SUMMARY  Patient Name: Petra Hameed : 1970   Treatment/Medical Diagnosis: Low back pain [M54.5]   Referral Source: Jb Reyes DO       Date of Initial Visit: 19 Attended Visits: 7 Missed Visits: 0      SUMMARY OF TREATMENT  Petra Hameed has been seen at our clinic  for a total of 7 visits. Pt treatment has consisted of  therapeutic exercise for thoraic ROM, hip/core strengthening, and manual therapy(jt mobilization and deep tissue mobilization/dry needling)    CURRENT STATUS  Pt has had a good tolerance to physical therapy treatment. She was only seen for 1 visit in 6wks and id not return to PT treatment after the 7th visit.      Goal/Measure of Progress Goal Met? 1. Pt will be independent with advanced HEP in preparation for DCADLs   Status at last Eval: na Current Status: na na     RECOMMENDATIONS  Discharge from physical therapy treatment with HEP. Specifics:   If you have any questions/comments please contact us directly at [phone]. Thank you for allowing us to assist in the care of your patient.     Therapist Signature: Angeles Jose, DANIAL, OCS, SCS, CSCS Date: 2020     Time: 11:49 AM

## 2020-03-05 ENCOUNTER — HOSPITAL ENCOUNTER (OUTPATIENT)
Dept: PHYSICAL THERAPY | Age: 50
Discharge: HOME | End: 2020-03-05
Payer: COMMERCIAL

## 2020-03-05 PROCEDURE — 97161 PT EVAL LOW COMPLEX 20 MIN: CPT | Performed by: PHYSICAL THERAPIST

## 2020-03-05 PROCEDURE — 97140 MANUAL THERAPY 1/> REGIONS: CPT | Performed by: PHYSICAL THERAPIST

## 2020-03-05 NOTE — PROGRESS NOTES
[facility] PHYSICAL THERAPY - DAILY TREATMENT NOTE    Patient Name: Linda Munguia        Date: 3/5/2020  : 1970   yes Patient  Verified  Visit #:   1     Insurance: Payor: Regan Single / [address]: [address] / Product Type: Commerical /      In time: 930 Out time: 1010   Total Treatment Time: 40     Medicare/[facility] Time Tracking (below)   Total Timed Codes (min):  na 1:1 Treatment Time:  na     TREATMENT AREA =  Right knee pain [M25.561]    SUBJECTIVE  Pain Level (on 0 to 10 scale):   10   Medication Changes/New allergies or changes in medical history, any new surgeries or procedures?    no  If yes, update Summary List   Subjective Functional Status/Changes:  []  No changes reported     See ie          OBJECTIVE      15 min Manual Therapy: HS and gm release, dtm ls paraspinals, knee prom   Rationale:      decrease pain, increase ROM, increase tissue extensibility and decrease trigger points to improve patient's ability to complete adls    Billed With/As:   [] TE   [] TA   [] Neuro   [] Self Care Patient Education: [x] Review HEP    [] Progressed/Changed HEP based on:   [] positioning   [] body mechanics   [] transfers   [] heat/ice application    [] other:      Other Objective/Functional Measures:    Significant tenderness along semi-mem and medial gastroc  See ie     Post Treatment Pain Level (on 0 to 10) scale:    10     ASSESSMENT  Assessment/Changes in Function:     See ie     []  See Progress Note/Recertification   Patient will continue to benefit from skilled PT services to modify and progress therapeutic interventions, address functional mobility deficits, address ROM deficits, address strength deficits, analyze and address soft tissue restrictions, analyze and cue movement patterns, analyze and modify body mechanics/ergonomics, assess and modify postural abnormalities, address imbalance/dizziness and instruct in home and community integration to attain remaining goals.    Progress toward goals / Updated goals:    See ie     PLAN  []  Upgrade activities as tolerated yes Continue plan of care   []  Discharge due to :    []  Other:      Therapist: Arthur Brice PT    Date: 3/5/2020 Time: 10:22 AM     Future Appointments   Date Time Provider Geraldine Burrell   3/10/2020  5:00 PM Imani [facility]   3/12/2020  3:30 PM Jaswinder Dorsey, PT [facility]   3/16/2020  3:00 PM Imain Farida [facility]   3/18/2020 11:30 AM Jaswinder Dorsey, PT [facility]   3/26/2020  6:30 AM Korin Bowser, PT [facility]   3/30/2020  3:00 PM Jaswinder Dorsye, PT [facility]   4/1/2020 10:30 AM Indira Woodson, PT [facility]

## 2020-03-05 NOTE — PROGRESS NOTES
ISMAEL Valero [address] PHYSICAL THERAPY   [facility], [address],[facility], 70 [facility] - Phone: [phone]  Fax: [phone] OF CARE / [address] [facility]  Patient Name: Kb Caceres : 1970   Medical   Diagnosis: R knee pain Treatment Diagnosis: Right knee pain [M25.561]   Onset Date: 20     Referral Source: Krishna Brown [place] [facility]): 3/5/2020   Prior Hospitalization: See medical history Provider #: [id number]   Prior Level of Function: Squats limited by pain   Comorbidities: LBP   Medications: Verified on Patient Summary List   The Plan of Care and following information is based on the information from the initial evaluation.   ===========================================================================================  Assessment / key information:  54 F arrives to clinic with c/o R joint line pain after dancing in high heels on New Years. She has had a hx of 2 arthroscopy due to MMT that resolved all sx except deep squatting. We have worked with Mrs. Raza and well aware of PMH and PLOF. Rates pain 3-9/10 increasing with activity. 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 +SIJ dysfunction.  Will attempt PT in order to address problem list below.   ===========================================================================================  Eval Complexity: History MEDIUM  Complexity : 1-2 comorbidities / personal factors will impact the outcome/ POC ;  Examination  HIGH Complexity : 4+ Standardized tests and measures addressing body structure, function, activity limitation and / or participation in recreation ; Presentation LOW Complexity : Stable, uncomplicated ;  Decision Making Other outcome measures objective findings  MEDIUM; Overall Complexity LOW   Problem List: pain affecting function, decrease ROM, decrease strength, edema affecting function, impaired gait/ balance, decrease ADL/ functional abilitiies, decrease activity tolerance, decrease flexibility/ joint mobility and decrease transfer abilities   Treatment Plan may include any combination of the following: Therapeutic exercise, Therapeutic activities, Neuromuscular re-education, Physical agent/modality, Gait/balance training, Manual therapy, Patient education, Self Care training, Functional mobility training, Home safety training and Stair training  Patient / Family readiness to learn indicated by: asking questions, trying to perform skills and interest  Persons(s) to be included in education: patient (P)  Barriers to Learning/Limitations: None  Measures taken, if barriers to learning:    Patient Goal (s): Pain free activities   Patient self reported health status: good  Rehabilitation Potential: good   Short Term Goals: To be accomplished in  2  weeks:  1. Pt will be compliant with hep  2. Pt will be able to perform 20\" bridges in order to improve glute strength for knee stability   3. Pt will note daily max pain </=6/10 in order to increase participation in Bringme: To be accomplished in  4  weeks:  1. Pt will be able to perform 2x10 four inch eccentric lateral step downs in order to improve knee stability  2. Pt will note daily max pain </=3/10 in order to increase participation in adls  3.  Pt will report +3 on GROC in order to show functional improvement   Frequency / Duration:   Patient to be seen  2  times per week for 4  weeks:  Patient / Caregiver education and instruction: self care, activity modification and exercises  Therapist Signature: Phi Perdomo PT Date: 1/0/5023   Certification Period: na Time: 10:29 AM ===========================================================================================  I certify that the above Physical Therapy Services are being furnished while the patient is under my care. I agree with the treatment plan and certify that this therapy is necessary. Physician Signature:        Date:       Time:     Please sign and return to [facility] [place], [facility]. or you may fax the signed copy to [phone]. Thank you.

## 2020-03-10 ENCOUNTER — HOSPITAL ENCOUNTER (OUTPATIENT)
Dept: PHYSICAL THERAPY | Age: 50
Discharge: HOME | End: 2020-03-10
Payer: COMMERCIAL

## 2020-03-10 PROCEDURE — 97140 MANUAL THERAPY 1/> REGIONS: CPT | Performed by: PHYSICAL THERAPIST

## 2020-03-10 PROCEDURE — 97110 THERAPEUTIC EXERCISES: CPT | Performed by: PHYSICAL THERAPIST

## 2020-03-10 NOTE — PROGRESS NOTES
Yaima Bower   PHYSICAL THERAPY - DAILY TREATMENT NOTE    Patient Name: Elise Urrutia        Date: 3/10/2020  : 1970   yes Patient  Verified  Visit #:   2     Insurance: Payor: Erwin Powell / Plan: [address] PT / Product Type: Commerical /      In time: 505 Out time: 555   Total Treatment Time: 50     Medicare/[facility] Time Tracking (below)   Total Timed Codes (min):  na 1:1 Treatment Time:  na     TREATMENT AREA =  Right knee pain [M25.561]    SUBJECTIVE  Pain Level (on 0 to 10 scale):  4  / 10   Medication Changes/New allergies or changes in medical history, any new surgeries or procedures?    no  If yes, update Summary List   Subjective Functional Status/Changes:  []  No changes reported     I went and saw the  and we did pretty much upper body stuff but having to constantly sit down and stand up with weights in my hand really made the front part of my legs sore           OBJECTIVE      25 min Therapeutic Exercise:  [x]  See flow sheet   Rationale:      increase ROM and increase strength to improve the patients ability to complete adls     25 min Manual Therapy: Hs/adductor release, l5 RR correction, gm release   Rationale:      decrease pain, increase ROM, increase tissue extensibility and decrease trigger points to improve patient's ability to complete adls      Billed With/As:   [] TE   [] TA   [] Neuro   [] Self Care Patient Education: [x] Review HEP    [] Progressed/Changed HEP based on:   [] positioning   [] body mechanics   [] transfers   [] heat/ice application    [] other:      Other Objective/Functional Measures:    ttp along medial hs and adductor mid mm belly and insertion  Noted R anterior inn with increased HS mm tightness     Post Treatment Pain Level (on 0 to 10) scale:   2  / 10     ASSESSMENT  Assessment/Changes in Function:     No increase in pain following initial session     []  See Progress Note/Recertification   Patient will continue to benefit from skilled PT services to modify and progress therapeutic interventions, address functional mobility deficits, address ROM deficits, address strength deficits, analyze and address soft tissue restrictions, analyze and cue movement patterns, analyze and modify body mechanics/ergonomics, assess and modify postural abnormalities, address imbalance/dizziness and instruct in home and community integration to attain remaining goals.    Progress toward goals / Updated goals:    No changes towards goal since IE - will continue to progress program to address      PLAN  []  Upgrade activities as tolerated yes Continue plan of care   []  Discharge due to :    []  Other:      Therapist: Nirmala Campos PT    Date: 3/10/2020 Time: 8:46 AM     Future Appointments   Date Time Provider Geraldine Burrell   3/10/2020  5:00 PM Kayla Listen [facility]   3/12/2020  3:30 PM Naeem Patterson, PT [facility]   3/16/2020  3:00 PM Kayla Listen [facility]   3/18/2020 11:30 AM Naeem Patterson, PT [facility]   3/26/2020  6:30 AM Paul Henderson, PT [facility]   3/30/2020  3:00 PM Naeem Patterson, PT [facility]   4/1/2020 10:30 AM Shalonda Woodson, PT [facility]

## 2020-03-12 ENCOUNTER — HOSPITAL ENCOUNTER (OUTPATIENT)
Dept: PHYSICAL THERAPY | Age: 50
Discharge: HOME | End: 2020-03-12
Payer: COMMERCIAL

## 2020-03-12 PROCEDURE — 97140 MANUAL THERAPY 1/> REGIONS: CPT | Performed by: PHYSICAL THERAPIST

## 2020-03-12 NOTE — PROGRESS NOTES
Valentina Plunkett   PHYSICAL THERAPY - DAILY TREATMENT NOTE    Patient Name: Sharif Lawton        Date: 3/12/2020  : 1970   yes Patient  Verified  Visit #:   3     Insurance: Payor: Leslee Mathews / Plan: [address] PT / Product Type: Commerical /      In time: 335 Out time: 410   Total Treatment Time: 35     Medicare/[employer] Time Tracking (below)   Total Timed Codes (min):  na 1:1 Treatment Time:  na     TREATMENT AREA =  Right knee pain [M25.561]    SUBJECTIVE  Pain Level (on 0 to 10 scale):  4  / 10   Medication Changes/New allergies or changes in medical history, any new surgeries or procedures?    no  If yes, update Summary List   Subjective Functional Status/Changes:  []  No changes reported       I am actually not feeling too bad in my knee, the back of my leg is really sore just feels more sore        OBJECTIVE      35 min Manual Therapy: Hs/adductor release, l5 RR correction, gm release   Rationale:      decrease pain, increase ROM, increase tissue extensibility and decrease trigger points to improve patient's ability to complete adls      Billed With/As:   [] TE   [] TA   [] Neuro   [] Self Care Patient Education: [x] Review HEP    [] Progressed/Changed HEP based on:   [] positioning   [] body mechanics   [] transfers   [] heat/ice application    [] other:      Other Objective/Functional Measures:  Due to time constraint only able to perform mt  Noted tightness along posterior chain medial hs and glute med most involved      Post Treatment Pain Level (on 0 to 10) scale:   3  / 10     ASSESSMENT  Assessment/Changes in Function:   Lumbar/sij dysfunction likely contributing to posterior chain tightness        []  See Progress Note/Recertification   Patient will continue to benefit from skilled PT services to modify and progress therapeutic interventions, address functional mobility deficits, address ROM deficits, address strength deficits, analyze and address soft tissue restrictions, analyze and cue movement patterns, analyze and modify body mechanics/ergonomics, assess and modify postural abnormalities, address imbalance/dizziness and instruct in home and community integration to attain remaining goals.    Progress toward goals / Updated goals:    Slow progress with consistent pain reduction      PLAN  []  Upgrade activities as tolerated yes Continue plan of care   []  Discharge due to :    []  Other:      Therapist: Payton Kirkland PT    Date: 3/12/2020 Time: 8:46 AM     Future Appointments   Date Time Provider Geraldine Burrell   3/12/2020  3:30 PM Srinivas Sexton, PT [facility]   3/16/2020  3:00 PM Ulises Cardoso [facility]   3/18/2020 11:30 AM Srinivas Sexton, PT [facility]   3/26/2020  6:30 AM Jeannie Andre, PT [facility]   3/30/2020  3:00 PM Srinivas Sexton, PT [facility]   4/1/2020 10:30 AM Adriana Woodson, PT [facility]

## 2020-03-17 ENCOUNTER — HOSPITAL ENCOUNTER (OUTPATIENT)
Dept: PHYSICAL THERAPY | Age: 50
Discharge: HOME | End: 2020-03-17
Payer: COMMERCIAL

## 2020-03-17 PROCEDURE — 97110 THERAPEUTIC EXERCISES: CPT | Performed by: PHYSICAL THERAPIST

## 2020-03-17 PROCEDURE — 97140 MANUAL THERAPY 1/> REGIONS: CPT | Performed by: PHYSICAL THERAPIST

## 2020-03-17 NOTE — PROGRESS NOTES
Yaima Bower   PHYSICAL THERAPY - DAILY TREATMENT NOTE    Patient Name: Elise Urrutia        Date: 3/17/2020  : 1970   yes Patient  Verified  Visit #:     Insurance: Payor: Erwin Powell / Plan: [address] PT / Product Type: Commerical /      In time: 1140 Out time: 3746   Total Treatment Time: 55     Medicare/[facility] Time Tracking (below)   Total Timed Codes (min):  na 1:1 Treatment Time:  na     TREATMENT AREA =  Right knee pain [M25.561]    SUBJECTIVE  Pain Level (on 0 to 10 scale):  2  / 10   Medication Changes/New allergies or changes in medical history, any new surgeries or procedures?    no  If yes, update Summary List   Subjective Functional Status/Changes:  []  No changes reported     I did a lot of running around over the weekend at the hospital so I am feeling it a little bit more on the medial aspect of my knee - no edema or knee buckling but just an ache         OBJECTIVE      25 min Therapeutic Exercise:  [x]  See flow sheet   Rationale:      increase ROM, increase strength, improve coordination, improve balance and increase proprioception to improve the patients ability to complete adls     30 min Manual Therapy: R on R correction, l5/s1 L rotation correction, dtm hs/gastroc, adductor release   Rationale:      decrease pain, increase ROM, increase tissue extensibility and decrease trigger points to improve patient's ability to complete adls   Billed With/As:   [] TE   [] TA   [] Neuro   [] Self Care Patient Education: [x] Review HEP    [] Progressed/Changed HEP based on:   [] positioning   [] body mechanics   [] transfers   [] heat/ice application    [] other:      Other Objective/Functional Measures:    mt followed by te  ttp along medial hs and adductor, pes anserine unremarkable, tibial rotation normal, te as per flow sheet with fatigued noted mini-band progression      Post Treatment Pain Level (on 0 to 10) scale:   4  / 10     ASSESSMENT  Assessment/Changes in Function: Lumbosacral dysfunction contributing to continued posterior chain tightness and anterior knee pain      []  See Progress Note/Recertification   Patient will continue to benefit from skilled PT services to modify and progress therapeutic interventions, address functional mobility deficits, address ROM deficits, address strength deficits, analyze and address soft tissue restrictions, analyze and cue movement patterns, analyze and modify body mechanics/ergonomics, assess and modify postural abnormalities, address imbalance/dizziness and instruct in home and community integration to attain remaining goals.    Progress toward goals / Updated goals:    Slow progress with consistent pain reduction      PLAN  []  Upgrade activities as tolerated yes Continue plan of care   []  Discharge due to :    []  Other:      Therapist: Margo Huang, PT    Date: 3/17/2020 Time: 3:34 PM     Future Appointments   Date Time Provider Geraldine Burrell   3/18/2020 11:30 AM Andreea Zapata, PT [facility]   3/26/2020  6:30 AM Ashley Patel, PT [facility]   3/30/2020  3:00 PM Andreea Zapata, PT [facility]   4/1/2020 10:30 AM Juan Woodson, PT [facility]

## 2020-03-18 ENCOUNTER — HOSPITAL ENCOUNTER (OUTPATIENT)
Dept: PHYSICAL THERAPY | Age: 50
Discharge: HOME | End: 2020-03-18
Payer: COMMERCIAL

## 2020-03-18 PROCEDURE — 97140 MANUAL THERAPY 1/> REGIONS: CPT | Performed by: PHYSICAL THERAPIST

## 2020-03-18 PROCEDURE — 97110 THERAPEUTIC EXERCISES: CPT | Performed by: PHYSICAL THERAPIST

## 2020-03-18 NOTE — PROGRESS NOTES
[place]   PHYSICAL THERAPY - DAILY TREATMENT NOTE    Patient Name: Arnel Leigh        Date: 3/18/2020  : 1970   yes Patient  Verified  Visit #:     Insurance: Payor: Alma Henry / Plan: [address] PT / Product Type: Commerical /      In time: 69 Out time:    Total Treatment Time: 52     Medicare/[employer] Time Tracking (below)   Total Timed Codes (min):  na 1:1 Treatment Time:  na     TREATMENT AREA =  Right knee pain [M25.561]    SUBJECTIVE  Pain Level (on 0 to 10 scale):  4  / 10   Medication Changes/New allergies or changes in medical history, any new surgeries or procedures?    no  If yes, update Summary List   Subjective Functional Status/Changes:  []  No changes reported     My calf has really been bothering me        OBJECTIVE      25 min Therapeutic Exercise:  [x]  See flow sheet   Rationale:      increase ROM, increase strength, improve coordination, improve balance and increase proprioception to improve the patients ability to complete adls     27 min Manual Therapy: R on R correction, l5/s1 L rotation correction, dtm hs, adductor release   Rationale:      decrease pain, increase ROM, increase tissue extensibility and decrease trigger points to improve patient's ability to complete adls   Billed With/As:   [] TE   [] TA   [] Neuro   [] Self Care Patient Education: [x] Review HEP    [] Progressed/Changed HEP based on:   [] positioning   [] body mechanics   [] transfers   [] heat/ice application    [] other:      Other Objective/Functional Measures:  1.5 cm increased in edema in gastroc at all major landmarks with increased tenderness along posterior compartment held on mfr to this area due to subjective section  te as per flow sheet without visible distress      Post Treatment Pain Level (on 0 to 10) scale:    10     ASSESSMENT  Assessment/Changes in Function:       No increase in s/s following session    []  See Progress Note/Recertification   Patient will continue to benefit from skilled PT services to modify and progress therapeutic interventions, address functional mobility deficits, address ROM deficits, address strength deficits, analyze and address soft tissue restrictions, analyze and cue movement patterns, analyze and modify body mechanics/ergonomics, assess and modify postural abnormalities, address imbalance/dizziness and instruct in home and community integration to attain remaining goals. Progress toward goals / Updated goals:     Will perform functional scale next session in order to assess progress towards ltg      PLAN  []  Upgrade activities as tolerated yes Continue plan of care   []  Discharge due to :    []  Other:      Therapist: Justin Mayer, PT    Date: 3/18/2020 Time: 3:34 PM     Future Appointments   Date Time Provider Geraldine Burrell   3/18/2020 11:30 AM Lucia Busby, PT [facility]   3/26/2020  6:30 AM Lester Rojas, PT [facility]   3/30/2020  3:00 PM Lucia Busby, PT [facility]   4/1/2020 10:30 AM Zaira Woodson, PT [facility]

## 2020-03-26 ENCOUNTER — APPOINTMENT (OUTPATIENT)
Dept: PHYSICAL THERAPY | Age: 50
End: 2020-03-26
Payer: COMMERCIAL

## 2020-03-26 NOTE — PROGRESS NOTES
.. PHYSICAL THERAPY - COURTESY CHECK-IN PHONE CALL    Patient Name: Kb Caceres        Date: 3/26/2020  : 1970   yes Patient  Verified  Insurance: Payor: Marino Templeton / [address] / Product Type: Commerical /      Start time: 930 End time: 950     Detail of Conversation:    Discussed with pt education on current hep, activity and footwear modification.      Follow-up Actions:    [x] Check-in via phone in 1-2 weeks  [] Provide updated HEP  [] Discharge no further need for check-ins (write DC note and send to physician)    Other: will continue therapy pending CDC guidelines      Therapist: Valerie Stewart PT    Date: 3/26/2020 Time: 1:35 PM

## 2020-03-26 NOTE — PROGRESS NOTES
.[place] [id number] PHYSICAL THERAPY  [address] [place] [address] [address],[facility], 70 [facility] - Phone: [phone]  Fax: [phone]  PROGRESS NOTE  Patient Name: Antonella Smart : 1970   Treatment/Medical Diagnosis: Right knee pain [M25.561]   Referral Source: Shayna Bates*     Date of Initial Visit: 3/5/20 Attended Visits: 5 Missed Visits: See below     SUMMARY OF TREATMENT  Pt was seen for IE and 4 f/u visits with treatment consisting of therapeutic exercises for knee rom/LE strengthening, manual therapy (prom/mobs/dtm) and instruction on hep activity and footwear modification. CURRENT STATUS  Pt has made intermittent progress with PT. Her visits have been limited due to COVD-19 and her hours at hospital have impacted hep/activity modification. Continues with medial knee pain with prolonged CKC activities. Would like to continue with therapy when able to address goals below    Goal/Measure of Progress Goal Met? 1. Pt will be able to perform 2x10 four inch eccentric lateral step downs in orde ot    Status at last Eval: unable Current Status: 10 times progressing   2. Pt will note daily max pain </=3/10 in order to increase partiicpation in adls   Status at last Eval: 9/10 Current Status: 6/10 progressing   3. Pt will report +3 on GROC in order to show functional improvement   Status at last Eval: na Current Status: Pt did not fill out n/a     New Goals to be achieved in __4__  weeks:  Continue as above   RECOMMENDATIONS  Pt temporarily placed on hold due to the nationwide concerns over COVID-19. Pt issued HEP and therapy staff will continue to contact pt every 1-2 weeks via phone to monitor progress. Plan to resume physical therapy once concerns improve. Thank you. If you have any questions/comments please contact us directly at [phone]. Thank you for allowing us to assist in the care of your patient.     Therapist Signature: Shira Lilly, LISS BARROW  Date: 3/26/2020     Time: 9:52 AM   NOTE TO PHYSICIAN:  PLEASE COMPLETE THE ORDERS BELOW AND FAX TO   [facility]: [phone]  If you are unable to process this request in 24 hours please contact our office: [phone]    ___ I have read the above report and request that my patient continue as recommended.   ___ I have read the above report and request that my patient continue therapy with the following changes/special instructions:_________________________________________________________   ___ I have read the above report and request that my patient be discharged from therapy.      Physician Signature:        Date:       Time:

## 2020-03-30 ENCOUNTER — APPOINTMENT (OUTPATIENT)
Dept: PHYSICAL THERAPY | Age: 50
End: 2020-03-30
Payer: COMMERCIAL

## 2020-04-01 ENCOUNTER — APPOINTMENT (OUTPATIENT)
Dept: PHYSICAL THERAPY | Age: 50
End: 2020-04-01

## 2020-04-08 NOTE — PROGRESS NOTES
.. PHYSICAL THERAPY - COURTESY CHECK-IN PHONE CALL    Patient Name: Zaira Allen        Date: 2020  : 1970   yes Patient  Verified  Insurance: Payor: Sola Pedro / Plan: [address] PT / Product Type: Commerical /      Start time: 900 End time: 451     Detail of Conversation:    Discussed with pt education on current hep that can be performed in clinical setting or without equipment and clinic update including possibility of d/c (which pt requested to continue once able)    Follow-up Actions:    [x] Check-in via phone in 1-2 weeks  [] Provide updated HEP  [] Discharge no further need for check-ins (write DC note and send to physician)    Other: will continue therapy pending CDC guidelines      Therapist: Indira Balderas, PT    Date: 2020 Time: 1:35 PM

## 2020-05-13 ENCOUNTER — HOSPITAL ENCOUNTER (OUTPATIENT)
Dept: PHYSICAL THERAPY | Age: 50
Discharge: HOME | End: 2020-05-13
Payer: COMMERCIAL

## 2020-05-13 PROCEDURE — 97140 MANUAL THERAPY 1/> REGIONS: CPT | Performed by: PHYSICAL THERAPIST

## 2020-05-13 NOTE — PROGRESS NOTES
.[place] [address] THERAPY  [address] [place] [address], [facility], 70 [facility] - Phone: [phone]  Fax: [phone]  PROGRESS NOTE  Patient Name: Dorene Pedraza : 1970   Treatment/Medical Diagnosis: Right knee pain [M25.561]   Referral Source: Flex Garsia*     Date of Initial Visit: 3/5/20 Attended Visits: 6 Missed Visits: See below     SUMMARY OF TREATMENT  Pt was seen for IE and 5 f/u visits with treatment consisting of therapeutic exercises for knee rom/LE strengthening, manual therapy (prom/mobs/dtm) and instruction on hep activity and footwear modification. CURRENT STATUS  Due to COVID-19 pt has not been seen in clinic since March. During that time period she was unable to consistently participate in any exercises and thus has regressed back to IE status. Continues to have pain along pes anserine with noted swelling in area. +adductor kenneth, hs tightness as well as reduced quad and glute med strength. She has a hx of lumbar radiculopathy and SIJ dysfunction which are also contributing to to biomechanical dysfunction. Pt is moving out of area in approximately one month. Would like to continue with therapy until that time and progress her to program in [facility]. Goal/Measure of Progress Goal Met? 1. Pt will be able to perform 2x10 four inch eccentric lateral step downs in orde ot    Status at last Eval: 10 times Current Status: Medial collapse  no   2. Pt will note daily max pain </=3/10 in order to increase partiicpation in adls   Status at last Eval: 6/10 Current Status: 9/10 no   3.   Pt will report +3 on GROC in order to show functional improvement   Status at last Eval: na Current Status: Pt did not fill out n/a     New Goals to be achieved in __4__  weeks:  Continue as above   RECOMMENDATIONS  1-2x/4 weeks with return to core stability program     If you have any questions/comments please contact us directly at [phone]. Thank you for allowing us to assist in the care of your patient. Therapist Signature: Tod Ragsdale DPT, MTC  Date: 5/13/2020     Time: 9:52 AM   NOTE TO PHYSICIAN:  PLEASE COMPLETE THE ORDERS BELOW AND FAX TO   [facility]: [phone]  If you are unable to process this request in 24 hours please contact our office: [phone]    ___ I have read the above report and request that my patient continue as recommended.   ___ I have read the above report and request that my patient continue therapy with the following changes/special instructions:_________________________________________________________   ___ I have read the above report and request that my patient be discharged from therapy.      Physician Signature:        Date:       Time:

## 2020-05-13 NOTE — PROGRESS NOTES
[facility] PHYSICAL THERAPY - DAILY TREATMENT NOTE    Patient Name: Tang Harding        Date: 2020  : 1970   yes Patient  Verified  Visit #:     Insurance: Payor: Leona Silver / Plan: [address] PT / Product Type: Commerical /      In time: 467 Out time: 1000   Total Treatment Time: 45     Medicare/[facility] Time Tracking (below)   Total Timed Codes (min):  na 1:1 Treatment Time:  na     TREATMENT AREA =  Right knee pain [M25.561]    SUBJECTIVE  Pain Level (on 0 to 10 scale):  4  / 10   Medication Changes/New allergies or changes in medical history, any new surgeries or procedures?    no  If yes, update Summary List   Subjective Functional Status/Changes:  []  No changes reported     See pn     OBJECTIVE      45 min Manual Therapy: R on R correction, l5/s1 L rotation correction, dtm hs, adductor release, t/s rr, biomechanical assessment    Rationale:      decrease pain, increase ROM, increase tissue extensibility and decrease trigger points to improve patient's ability to complete adls   Billed With/As:   [] TE   [] TA   [] Neuro   [] Self Care Patient Education: [x] Review HEP    [] Progressed/Changed HEP based on:   [] positioning   [] body mechanics   [] transfers   [] heat/ice application    [] other:      Other Objective/Functional Measures:  See pn      Post Treatment Pain Level (on 0 to 10) scale:   1 / 10     ASSESSMENT  Assessment/Changes in Function:     See pn     []  See Progress Note/Recertification   Patient will continue to benefit from skilled PT services to modify and progress therapeutic interventions, address functional mobility deficits, address ROM deficits, address strength deficits, analyze and address soft tissue restrictions, analyze and cue movement patterns, analyze and modify body mechanics/ergonomics, assess and modify postural abnormalities, address imbalance/dizziness and instruct in home and community integration to attain remaining goals.    Progress toward goals / Updated goals:    See pn      PLAN  []  Upgrade activities as tolerated yes Continue plan of care   []  Discharge due to :    []  Other:      Therapist: Kanu Ryan PT    Date: 5/13/2020 Time: 3:34 PM     No future appointments.

## 2020-06-17 ENCOUNTER — HOSPITAL ENCOUNTER (OUTPATIENT)
Dept: PHYSICAL THERAPY | Age: 50
Discharge: HOME | End: 2020-06-17
Payer: COMMERCIAL

## 2020-06-17 PROCEDURE — 97140 MANUAL THERAPY 1/> REGIONS: CPT | Performed by: PHYSICAL THERAPIST

## 2020-06-17 NOTE — PROGRESS NOTES
[facility] PHYSICAL THERAPY - DAILY TREATMENT NOTE    Patient Name: David Humphrey        Date: 2020  : 1970   yes Patient  Verified  Visit #:     Insurance: Payor: Christine Russell / Plan: [address] PT / Product Type: Commerical /      In time: 135 Out time: 210   Total Treatment Time: 35     Medicare/[employer] Time Tracking (below)   Total Timed Codes (min):  na 1:1 Treatment Time:  na     TREATMENT AREA =  Right knee pain [M25.561]    SUBJECTIVE  Pain Level (on 0 to 10 scale):  2  / 10   Medication Changes/New allergies or changes in medical history, any new surgeries or procedures?    no  If yes, update Summary List   Subjective Functional Status/Changes:  []  No changes reported     See pn     OBJECTIVE      35 min Manual Therapy:    Rationale:      decrease pain, increase ROM, increase tissue extensibility and decrease trigger points to improve patient's ability to complete adls   Billed With/As:   [] TE   [] TA   [] Neuro   [] Self Care Patient Education: [x] Review HEP    [] Progressed/Changed HEP based on:   [] positioning   [] body mechanics   [] transfers   [] heat/ice application    [] other:      Other Objective/Functional Measures:  See pn      Post Treatment Pain Level (on 0 to 10) scale:   1 / 10     ASSESSMENT  Assessment/Changes in Function:     See pn     []  See Progress Note/Recertification   Patient will continue to benefit from skilled PT services to modify and progress therapeutic interventions, address functional mobility deficits, address ROM deficits, address strength deficits, analyze and address soft tissue restrictions, analyze and cue movement patterns, analyze and modify body mechanics/ergonomics, assess and modify postural abnormalities, address imbalance/dizziness and instruct in home and community integration to attain remaining goals.    Progress toward goals / Updated goals:    See pn      PLAN  []  Upgrade activities as tolerated yes Continue plan of care []  Discharge due to :    []  Other:      Therapist: Alexis Bentley PT    Date: 6/17/2020 Time: 3:34 PM     No future appointments.

## 2020-06-18 NOTE — PROGRESS NOTES
.[facility] [address] THERAPY  [address] [place] [address], [facility] 201,[facility], [facility] - Phone: [phone]  Fax: [phone]  PROGRESS NOTE  Patient Name: Olivia Sherman : 1970   Treatment/Medical Diagnosis: Right knee pain [M25.561]   Referral Source: Glenn Love*     Date of Initial Visit: 3/5/20 Attended Visits: 7 Missed Visits: See below     SUMMARY OF TREATMENT  Pt was seen for IE and 6  f/u visits with treatment consisting of therapeutic exercises for knee rom/LE strengthening, manual therapy (prom/mobs/dtm) and instruction on hep activity and footwear modification. CURRENT STATUS  Pt suffered a Lumbar disc herniation approximately 3 weeks ago. She has significant pain and R radiculopathy to calf. This impacted/limited all ADLs and thus PT progression exercise. She received LEISA from Dr. Lyndsay Lares last week which improved nearly 90% of all above mentioned sx. We have now been able to focus on LE biomechanics/strengthening to addressed medial knee pain. Again her hospital schedule and travel limit the number of visits and makes her progress slow but steady. However she has seen improvement in ability to squat/sit/walk without pain. She moves out of the area in one month and would like to continue therapy during this time to progress her strength     Goal/Measure of Progress Goal Met? 1. Pt will be able to perform 2x10 four inch eccentric lateral step downs in orde to improve strength for squatting   Status at last Eval: 10 times Current Status: Medial collapse  yes   2. Pt will note daily max pain </=3/10 in order to increase partiicpation in adls   Status at last Eval: 9/10 Current Status: 6/10 progressing   3. Pt will report +3 on GROC in order to show functional improvement   Status at last Eval: na Current Status: +4 yes     New Goals to be achieved in __4__  weeks:  1. Achieve goal #2  2.  Pt will be able to perform parallel squats with good form in order to perform repetitive movements at work  3. Pt will be I with advanced hep in order to prepare for dc    RECOMMENDATIONS  1-2x/4 weeks with return to core stability program     If you have any questions/comments please contact us directly at [phone]. Thank you for allowing us to assist in the care of your patient. Therapist Signature: Kailyn King DPT, [facility]  Date: 6/18/2020     Time: 9:52 AM   NOTE TO PHYSICIAN:  PLEASE COMPLETE THE ORDERS BELOW AND FAX TO   [facility]: [phone]  If you are unable to process this request in 24 hours please contact our office: [phone]    ___ I have read the above report and request that my patient continue as recommended.   ___ I have read the above report and request that my patient continue therapy with the following changes/special instructions:_________________________________________________________   ___ I have read the above report and request that my patient be discharged from therapy.      Physician Signature:        Date:       Time:

## 2020-06-25 ENCOUNTER — HOSPITAL ENCOUNTER (OUTPATIENT)
Dept: PHYSICAL THERAPY | Age: 50
Discharge: HOME | End: 2020-06-25
Payer: COMMERCIAL

## 2020-06-25 PROCEDURE — 97140 MANUAL THERAPY 1/> REGIONS: CPT | Performed by: PHYSICAL THERAPIST

## 2020-06-25 NOTE — PROGRESS NOTES
[facility] PHYSICAL THERAPY - DAILY TREATMENT NOTE    Patient Name: Leslie Duque        Date: 2020  : 1970   yes Patient  Verified  Visit #:     Insurance: Payor: Suze Morales / Plan: [address] PT / Product Type: Commerical /      In time: 9751 Out time: 451   Total Treatment Time: 40     Medicare/[facility] Time Tracking (below)   Total Timed Codes (min):  na 1:1 Treatment Time:  na     TREATMENT AREA =  Right knee pain [M25.561]    SUBJECTIVE  Pain Level (on 0 to 10 scale):  1  / 10   Medication Changes/New allergies or changes in medical history, any new surgeries or procedures?    no  If yes, update Summary List   Subjective Functional Status/Changes:  []  No changes reported     I am feeling better and better as we are moving along with exercises and treatment sessions.  Worked an overnight shift with no increas in pain only stiffness     OBJECTIVE      40 min Manual Therapy: Biomechanical assessment, dtm glute med/quad, SI correction, tl junction mobs   Rationale:      decrease pain, increase ROM, increase tissue extensibility and decrease trigger points to improve patient's ability to complete adls   Billed With/As:   [] TE   [] TA   [] Neuro   [] Self Care Patient Education: [x] Review HEP    [] Progressed/Changed HEP based on:   [] positioning   [] body mechanics   [] transfers   [] heat/ice application    [] other:      Other Objective/Functional Measures:  Difficulty performing hip hinge during squats, noted SIJ anterior inn and outflare with poor glute firing, performed MT and pt able to perform prone hip and hip hinge without cues  Reviewed advanced hep with patient   Post Treatment Pain Level (on 0 to 10) scale:   0 / 10     ASSESSMENT  Assessment/Changes in Function:   No increase in pain following session    []  See Progress Note/Recertification   Patient will continue to benefit from skilled PT services to modify and progress therapeutic interventions, address functional mobility deficits, address ROM deficits, address strength deficits, analyze and address soft tissue restrictions, analyze and cue movement patterns, analyze and modify body mechanics/ergonomics, assess and modify postural abnormalities, address imbalance/dizziness and instruct in home and community integration to attain remaining goals. Progress toward goals / Updated goals:    Pain goal achieved this session      PLAN  []  Upgrade activities as tolerated yes Continue plan of care   []  Discharge due to :    []  Other:      Therapist: Do Chavez, PT    Date: 6/25/2020 Time: 3:34 PM     No future appointments.

## 2020-06-30 ENCOUNTER — HOSPITAL ENCOUNTER (OUTPATIENT)
Dept: PHYSICAL THERAPY | Age: 50
Discharge: HOME | End: 2020-06-30
Payer: COMMERCIAL

## 2020-06-30 PROCEDURE — 97140 MANUAL THERAPY 1/> REGIONS: CPT | Performed by: PHYSICAL THERAPIST

## 2020-06-30 NOTE — PROGRESS NOTES
[facility] PHYSICAL THERAPY - DAILY TREATMENT NOTE    Patient Name: Chelsey Li        Date: 2020  : 1970   yes Patient  Verified  Visit #:    Insurance: Payor: Avelino Dotson / Plan: [address] PT / Product Type: Commerical /      In time: 115 Out time: 200   Total Treatment Time: 45     Medicare/[facility] Time Tracking (below)   Total Timed Codes (min):  na 1:1 Treatment Time:  na     TREATMENT AREA =  Right knee pain [M25.561]    SUBJECTIVE  Pain Level (on 0 to 10 scale):  1  / 10   Medication Changes/New allergies or changes in medical history, any new surgeries or procedures?    no  If yes, update Summary List   Subjective Functional Status/Changes:  []  No changes reported   I do not know what I did but yesterday I woke up I could barely move. I did have to push a stretcher and trim some trees but I did not feel any pain while I was doing that.  I am miserable       OBJECTIVE      45 min Manual Therapy: Biomechanical assessment, dtm glute med/quad, SI correction, tl junction mobs, rf/tfl, gm release   Rationale:      decrease pain, increase ROM, increase tissue extensibility and decrease trigger points to improve patient's ability to complete adls   Billed With/As:   [] TE   [] TA   [] Neuro   [] Self Care Patient Education: [x] Review HEP    [] Progressed/Changed HEP based on:   [] positioning   [] body mechanics   [] transfers   [] heat/ice application    [] other:      Other Objective/Functional Measures:  Arrived to session with flexed trunk posture with difficulty obtaining upright posture, noted R ant inn with outflare, performed mt and pt able to standing in full extension pain free but \"tightness\" reported at end range L rotation and flex    Post Treatment Pain Level (on 0 to 10) scale:   0 / 10     ASSESSMENT  Assessment/Changes in Function:   Unable to perform te due to lumbar radiculopathy    []  See Progress Note/Recertification   Patient will continue to benefit from skilled PT services to modify and progress therapeutic interventions, address functional mobility deficits, address ROM deficits, address strength deficits, analyze and address soft tissue restrictions, analyze and cue movement patterns, analyze and modify body mechanics/ergonomics, assess and modify postural abnormalities, address imbalance/dizziness and instruct in home and community integration to attain remaining goals. Progress toward goals / Updated goals:    No changes towards goals due to lumbar s/s      PLAN  []  Upgrade activities as tolerated yes Continue plan of care   []  Discharge due to :    []  Other:      Therapist: Yaquelin Kate PT    Date: 6/30/2020 Time: 3:34 PM     No future appointments.

## 2020-07-01 ENCOUNTER — APPOINTMENT (OUTPATIENT)
Dept: PHYSICAL THERAPY | Age: 50
End: 2020-07-01
Payer: COMMERCIAL

## 2020-07-22 ENCOUNTER — HOSPITAL ENCOUNTER (OUTPATIENT)
Dept: PHYSICAL THERAPY | Age: 50
Discharge: HOME | End: 2020-07-22
Payer: COMMERCIAL

## 2020-07-22 PROCEDURE — 97140 MANUAL THERAPY 1/> REGIONS: CPT | Performed by: PHYSICAL THERAPIST

## 2020-07-22 PROCEDURE — 97110 THERAPEUTIC EXERCISES: CPT | Performed by: PHYSICAL THERAPIST

## 2020-07-22 NOTE — PROGRESS NOTES
[facility] PHYSICAL THERAPY - DAILY TREATMENT NOTE    Patient Name: Tamara Romeo        Date: 2020  : 1970   yes Patient  Verified  Visit #:   10   of   12  Insurance: Payor: Kip Lombardo / Plan: [address] PT / Product Type: Commerical /      In time: 750 Out time: 900   Total Treatment Time: 70     Medicare/[facility] Time Tracking (below)   Total Timed Codes (min):  na 1:1 Treatment Time:  na     TREATMENT AREA =  Right knee pain [M25.561]    SUBJECTIVE  Pain Level (on 0 to 10 scale):  1  / 10   Medication Changes/New allergies or changes in medical history, any new surgeries or procedures?    no  If yes, update Summary List   Subjective Functional Status/Changes:  []  No changes reported     I have been staying compliant with my exercises and am actually feeling good.  Sometimes my itb acts up if I am on my feet a lot but better overall           OBJECTIVE    40 min Therapeutic Exercise:  [x]  See flow sheet   Rationale:      increase ROM, increase strength, improve coordination, improve balance and increase proprioception to improve the patients ability to complet eadls     30 min Manual Therapy: Biomechanical assessment, MET correction R on L, glute med/piri release, femoral posterior glide, biceps femoris release   Rationale:      decrease pain, increase ROM, increase tissue extensibility and decrease trigger points to improve patient's ability to complete adls        Billed With/As:   [] TE   [] TA   [] Neuro   [] Self Care Patient Education: [x] Review HEP    [] Progressed/Changed HEP based on:   [] positioning   [] body mechanics   [] transfers   [] heat/ice application    [] other:      Other Objective/Functional Measures:    Progressed current te as well as advanced hep as pt able to perform previously prescribed program with ease, difficulty maintaining L half knee hold without hip drop, ttp along biceps fem, no pain reported with femoral glides     Post Treatment Pain Level (on 0 to 10) scale:   2  / 10     ASSESSMENT  Assessment/Changes in Function:     Improved glute max strength noted with exercise progression, noted mm soreness at end of session      []  See Progress Note/Recertification   Patient will continue to benefit from skilled PT services to modify and progress therapeutic interventions, address functional mobility deficits, address ROM deficits, address strength deficits, analyze and address soft tissue restrictions, analyze and modify body mechanics/ergonomics and assess and modify postural abnormalities to attain remaining goals. Progress toward goals / Updated goals:    ltg #2 achieved      PLAN  []  Upgrade activities as tolerated yes Continue plan of care   []  Discharge due to :    []  Other:      Therapist: Lito Ventura, PT    Date: 7/22/2020 Time: 9:18 AM     No future appointments.

## 2020-07-28 ENCOUNTER — HOSPITAL ENCOUNTER (OUTPATIENT)
Dept: PHYSICAL THERAPY | Age: 50
Discharge: HOME | End: 2020-07-28
Payer: COMMERCIAL

## 2020-07-28 PROCEDURE — 97110 THERAPEUTIC EXERCISES: CPT | Performed by: PHYSICAL THERAPIST

## 2020-07-28 PROCEDURE — 97140 MANUAL THERAPY 1/> REGIONS: CPT | Performed by: PHYSICAL THERAPIST

## 2020-07-28 NOTE — PROGRESS NOTES
[facility] PHYSICAL THERAPY - DAILY TREATMENT NOTE    Patient Name: Daryl Golden        Date: 2020  : 1970   yes Patient  Verified  Visit #:     Insurance: Payor: Antione Eddy / Plan: Roseann Fontaine PT / Product Type: Commerical /      In time: 325 Out time: 405   Total Treatment Time: 45     Medicare/[employer] Time Tracking (below)   Total Timed Codes (min):  na 1:1 Treatment Time:  na     TREATMENT AREA =  Right knee pain [M25.561]    SUBJECTIVE  Pain Level (on 0 to 10 scale):  0  / 10   Medication Changes/New allergies or changes in medical history, any new surgeries or procedures?    no  If yes, update Summary List   Subjective Functional Status/Changes:  []  No changes reported     Knock on wood I am feeling pretty good with minimal pain        OBJECTIVE    30 min Therapeutic Exercise:  [x]  See flow sheet   Rationale:      increase ROM, increase strength, improve coordination, improve balance and increase proprioception to improve the patients ability to complet eadls     15 min Manual Therapy: Biomechanical assessment, MET correction R on L, glute med/piri release, femoral posterior glide, biceps femoris release   Rationale:      decrease pain, increase ROM, increase tissue extensibility and decrease trigger points to improve patient's ability to complete adls        Billed With/As:   [] TE   [] TA   [] Neuro   [] Self Care Patient Education: [x] Review HEP    [] Progressed/Changed HEP based on:   [] positioning   [] body mechanics   [] transfers   [] heat/ice application    [] other:      Other Objective/Functional Measures:    ttp along RF/tfl and psoas  Good glute med control with half kneel    Post Treatment Pain Level (on 0 to 10) scale:   0  / 10     ASSESSMENT  Assessment/Changes in Function:     No increase in pain following session      []  See Progress Note/Recertification   Patient will continue to benefit from skilled PT services to modify and progress therapeutic interventions, address functional mobility deficits, address ROM deficits, address strength deficits, analyze and address soft tissue restrictions, analyze and modify body mechanics/ergonomics and assess and modify postural abnormalities to attain remaining goals.    Progress toward goals / Updated goals:    D/c next session due to pt moving out of the area      PLAN  []  Upgrade activities as tolerated yes Continue plan of care   []  Discharge due to :    []  Other:      Therapist: La Woodard PT    Date: 7/28/2020 Time: 9:18 AM     Future Appointments   Date Time Provider Geraldine Burrell   7/29/2020  1:30 PM Norman Grigsby,  [address] [facility] no

## 2020-08-06 ENCOUNTER — HOSPITAL ENCOUNTER (OUTPATIENT)
Dept: PHYSICAL THERAPY | Age: 50
Discharge: HOME | End: 2020-08-06

## 2020-08-06 PROCEDURE — 97110 THERAPEUTIC EXERCISES: CPT | Performed by: PHYSICAL THERAPIST

## 2020-08-06 PROCEDURE — 97140 MANUAL THERAPY 1/> REGIONS: CPT | Performed by: PHYSICAL THERAPIST

## 2020-08-06 NOTE — PROGRESS NOTES
.[place] [id number] PHYSICAL THERAPY  [address] [place], [place] [address],[facility], [facility] - Phone: [phone]  Fax: [phone]  PROGRESS NOTE  Patient Name: Jovanni Gordon : 1970   Treatment/Medical Diagnosis: Right knee pain [M25.561]   Referral Source: Leana Hicks*     Date of Initial Visit: 3/5/20 Attended Visits: 12 Missed Visits: See below     SUMMARY OF TREATMENT  Pt was seen for IE and 11  f/u visits with treatment consisting of therapeutic exercises for knee rom/LE strengthening, manual therapy (prom/mobs/dtm) and instruction on hep activity and footwear modification. CURRENT STATUS  Pt has been seen a total of 5 times over the last 6 weeks. This is has been due to multiple factors including hospital scheduling, back pain and pt out of town. She has just recently learned she will not be moving out of town in next two weeks. Would like to continue with therapy to address goals below. She has made significant progress in her pain levels with max 1/10 at end of work shift without edema or knee buckling. She is able to negotiate stairs with reciprocal gait pattern pain free. At this time glute and eccentric quad weakness are limiting her adls. Goal/Measure of Progress Goal Met? 1. Pt will be able to perform 2x10 four inch eccentric lateral step downs in orde to improve strength for squatting   Status at last Eval: 10 times Current Status: Medial collapse  yes   2. Pt will note daily max pain </=3/10 in order to increase partiicpation in adls   Status at last Eval: 6/10 Current Status: 1/10 yes   3. Pt will be able to perform parallel squats with good form in order to perform repetitive movements at work   Status at last Eval: 45 degrees  Current Status: 65 degrees  progressing     New Goals to be achieved in __4__  weeks:  1. Achieve goal #2  2.   Pt will be I with advanced hep in order to prepare for dc    RECOMMENDATIONS  1-2x/2  weeks with return to core stability program     If you have any questions/comments please contact us directly at [phone]. Thank you for allowing us to assist in the care of your patient. Therapist Signature: Eric Loyd DPT, [facility]  Date: 8/6/2020     Time: 9:52 AM   NOTE TO PHYSICIAN:  PLEASE COMPLETE THE ORDERS BELOW AND FAX TO   [facility]: [phone]  If you are unable to process this request in 24 hours please contact our office: [phone]    ___ I have read the above report and request that my patient continue as recommended.   ___ I have read the above report and request that my patient continue therapy with the following changes/special instructions:_________________________________________________________   ___ I have read the above report and request that my patient be discharged from therapy.      Physician Signature:        Date:       Time:

## 2020-08-06 NOTE — PROGRESS NOTES
[facility] PHYSICAL THERAPY - DAILY TREATMENT NOTE    Patient Name: Jovanni Gordon        Date: 2020  : 1970   yes Patient  Verified  Visit #:   15   of   16  Insurance: Payor: Siena Kelley / Plan: [address] PT / Product Type: Commerical /      In time: 315 Out time: 405   Total Treatment Time: 50     Medicare/[employer] Time Tracking (below)   Total Timed Codes (min):  na 1:1 Treatment Time:  na     TREATMENT AREA =  Right knee pain [M25.561]    SUBJECTIVE  Pain Level (on 0 to 10 scale):  0  / 10   Medication Changes/New allergies or changes in medical history, any new surgeries or procedures?    no  If yes, update Summary List   Subjective Functional Status/Changes:  []  No changes reported     See pn      OBJECTIVE    35 min Therapeutic Exercise:  [x]  See flow sheet   Rationale:      increase ROM, increase strength, improve coordination, improve balance and increase proprioception to improve the patients ability to complet eadls     15 min Manual Therapy: Biomechanical assessment, MET correction R on L, glute med/piri release, femoral posterior glide, biceps femoris release   Rationale:      decrease pain, increase ROM, increase tissue extensibility and decrease trigger points to improve patient's ability to complete adls        Billed With/As:   [] TE   [] TA   [] Neuro   [] Self Care Patient Education: [x] Review HEP    [] Progressed/Changed HEP based on:   [] positioning   [] body mechanics   [] transfers   [] heat/ice application    [] other:      Other Objective/Functional Measures:    See pn    Post Treatment Pain Level (on 0 to 10) scale:   0  / 10     ASSESSMENT  Assessment/Changes in Function:     See pn      []  See Progress Note/Recertification   Patient will continue to benefit from skilled PT services to modify and progress therapeutic interventions, address functional mobility deficits, address ROM deficits, address strength deficits, analyze and address soft tissue restrictions, analyze and modify body mechanics/ergonomics and assess and modify postural abnormalities to attain remaining goals.    Progress toward goals / Updated goals:    See pn      PLAN  []  Upgrade activities as tolerated yes Continue plan of care   []  Discharge due to :    []  Other:      Therapist: Man Vázquez, PT    Date: 8/6/2020 Time: 9:18 AM     Future Appointments   Date Time Provider Geraldine Burrell   8/7/2020  8:45 AM Juan Antonio Woodson, PT [phone]

## 2020-08-10 ENCOUNTER — HOSPITAL ENCOUNTER (OUTPATIENT)
Dept: PHYSICAL THERAPY | Age: 50
Discharge: HOME | End: 2020-08-10

## 2020-08-10 PROCEDURE — 97140 MANUAL THERAPY 1/> REGIONS: CPT | Performed by: PHYSICAL THERAPIST

## 2020-08-10 PROCEDURE — 97110 THERAPEUTIC EXERCISES: CPT | Performed by: PHYSICAL THERAPIST

## 2020-08-10 NOTE — PROGRESS NOTES
[facility] PHYSICAL THERAPY - DAILY TREATMENT NOTE    Patient Name: Bi Lama        Date: 8/10/2020  : 1970   yes Patient  Verified  Visit #:   15   of   16  Insurance: Payor: Vicky Stable / Plan: [address] PT / Product Type: Commerical /      In time: 850 Out time: 930   Total Treatment Time: 40     Medicare/[facility] Time Tracking (below)   Total Timed Codes (min):  na 1:1 Treatment Time:  na     TREATMENT AREA =  Right knee pain [M25.561]    SUBJECTIVE  Pain Level (on 0 to 10 scale):  0  / 10   Medication Changes/New allergies or changes in medical history, any new surgeries or procedures?    no  If yes, update Summary List   Subjective Functional Status/Changes:  []  No changes reported   That low level ache is gone.       OBJECTIVE    25 min Therapeutic Exercise:  [x]  See flow sheet   Rationale:      increase ROM, increase strength, improve coordination, improve balance and increase proprioception to improve the patients ability to complet eadls     15 min Manual Therapy: glute med/piri/adductor/rf release, femoral posterior glide, biceps femoris release, sacral flexion,   Rationale:      decrease pain, increase ROM, increase tissue extensibility and decrease trigger points to improve patient's ability to complete adls        Billed With/As:   [] TE   [] TA   [] Neuro   [] Self Care Patient Education: [x] Review HEP    [] Progressed/Changed HEP based on:   [] positioning   [] body mechanics   [] transfers   [] heat/ice application    [] other:      Other Objective/Functional Measures:    ttp along R adductor mag, R TFL  Progressed te as per flow sheet to include standing glute strengthening    Post Treatment Pain Level (on 0 to 10) scale:   0  / 10     ASSESSMENT  Assessment/Changes in Function:     No pain with progression    []  See Progress Note/Recertification   Patient will continue to benefit from skilled PT services to modify and progress therapeutic interventions, address functional mobility deficits, address ROM deficits, address strength deficits, analyze and address soft tissue restrictions, analyze and modify body mechanics/ergonomics and assess and modify postural abnormalities to attain remaining goals.    Progress toward goals / Updated goals:    Pt has contacted PT facility in [place] to continue progression of program      PLAN  []  Upgrade activities as tolerated yes Continue plan of care   []  Discharge due to :    []  Other:      Therapist: Nena Ny PT    Date: 8/10/2020 Time: 9:18 AM     Future Appointments   Date Time Provider Geraldine Burrell   8/10/2020  8:45 AM Helena Woodson, PT [facility]

## 2020-08-12 ENCOUNTER — HOSPITAL ENCOUNTER (OUTPATIENT)
Dept: PHYSICAL THERAPY | Age: 50
Discharge: HOME | End: 2020-08-12

## 2020-08-12 PROCEDURE — 97140 MANUAL THERAPY 1/> REGIONS: CPT | Performed by: PHYSICAL THERAPIST

## 2020-08-12 PROCEDURE — 97110 THERAPEUTIC EXERCISES: CPT | Performed by: PHYSICAL THERAPIST

## 2020-08-12 NOTE — PROGRESS NOTES
[facility] PHYSICAL THERAPY - DAILY TREATMENT NOTE    Patient Name: Nessa Naranjo        Date: 2020  : 1970   yes Patient  Verified  Visit #:   15   of   16  Insurance: Payor: Bell Irby / [facility] [address] [facility] / Product Type: Commerical /      In time: 6506 Out time: 1115   Total Treatment Time: 56     Medicare/[facility] Time Tracking (below)   Total Timed Codes (min):  na 1:1 Treatment Time:  na     TREATMENT AREA =  Right knee pain [M25.561]    SUBJECTIVE  Pain Level (on 0 to 10 scale):  0  / 10   Medication Changes/New allergies or changes in medical history, any new surgeries or procedures?    no  If yes, update Summary List   Subjective Functional Status/Changes:  []  No changes reported   Just feel stiff      OBJECTIVE    40 min Therapeutic Exercise:  [x]  See flow sheet   Rationale:      increase ROM, increase strength, improve coordination, improve balance and increase proprioception to improve the patients ability to complet eadls     16 min Manual Therapy: glute med/piri/adductor/rf release, femoral posterior glide, biceps femoris release, sacral flexion,   Rationale:      decrease pain, increase ROM, increase tissue extensibility and decrease trigger points to improve patient's ability to complete adls        Billed With/As:   [] TE   [] TA   [] Neuro   [] Self Care Patient Education: [x] Review HEP    [] Progressed/Changed HEP based on:   [] positioning   [] body mechanics   [] transfers   [] heat/ice application    [] other:      Other Objective/Functional Measures:    Progressed te as per flow sheet - able to hold side planks without hip drop indicating improved glute med strength, ttp along popliteus    Post Treatment Pain Level (on 0 to 10) scale:   0  / 10     ASSESSMENT  Assessment/Changes in Function:   Increasing leg strength without progressive pain     []  See Progress Note/Recertification   Patient will continue to benefit from skilled PT services to modify and progress therapeutic interventions, address functional mobility deficits, address ROM deficits, address strength deficits, analyze and address soft tissue restrictions, analyze and modify body mechanics/ergonomics and assess and modify postural abnormalities to attain remaining goals.    Progress toward goals / Updated goals:  D.c next session due to pt moving out of [facility]       IPextreme  []  Upgrade activities as tolerated yes Continue plan of care   []  Discharge due to :    []  Other:      Therapist: Tamara Ledesma, PT    Date: 8/12/2020 Time: 9:18 AM     Future Appointments   Date Time Provider Geraldine Burrell   8/12/2020 10:15 AM Manav Woodson, PT [id number]

## 2020-10-06 NOTE — PROGRESS NOTES
.[address] PHYSICAL THERAPY  [address],[place], [address] [facility] - Phone: [phone]  Fax: [phone]  DISCHARGE NOTE  Patient Name: Tamara Romeo : 1970   Treatment/Medical Diagnosis: Right knee pain [M25.561]   Referral Source: Kerry Quiors*     Date of Initial Visit: 3/5/20 Attended Visits: 14 Missed Visits: See below     SUMMARY OF TREATMENT  Pt was seen for IE and 13  f/u visits with treatment consisting of therapeutic exercises for knee rom/LE strengthening, manual therapy (prom/mobs/dtm) and instruction on hep activity and footwear modification. CURRENT STATUS  Pt has been seen a total of 5 times over the last 8 weeks. This is has been due to multiple factors including hospital scheduling, back pain and pt out of town. She has just recently learned she will not be moving out of town in next two weeks. Would like to continue with therapy to address goals below. She has made significant progress in her pain levels with max 1/10 at end of work shift without edema or knee buckling. She is able to negotiate stairs with reciprocal gait pattern pain free. At this time glute and eccentric quad weakness are limiting her adls. HOWEVER SHE IS MOVING OUT OF AREA AND THEREFORE HAS TO D/C FROM THIS CLINIC AND STATES SHE WILL RESUME THERAPY IN [place]  Goal/Measure of Progress Goal Met? 1. Pt will be able to perform 2x10 four inch eccentric lateral step downs in orde to improve strength for squatting   Status at last Eval: 10 times Current Status: Medial collapse  yes   2. Pt will note daily max pain </=3/10 in order to increase partiicpation in adls   Status at last Eval: 6/10 Current Status: 110 yes   3.   Pt will be able to perform parallel squats with good form in order to perform repetitive movements at work   Status at last Eval: 65 degrees  Current Status: 65 degrees  no       RECOMMENDATIONS  D/c pt moved out of area      If you have any questions/comments please contact us directly at [phone]. Thank you for allowing us to assist in the care of your patient. Therapist Signature: Wayne Jiang DPT, [facility]  Date: 10/6/2020     Time: 9:52 AM   NOTE TO PHYSICIAN:  PLEASE COMPLETE THE ORDERS BELOW AND FAX TO   [facility]: [phone]  If you are unable to process this request in 24 hours please contact our office: [phone]    ___ I have read the above report and request that my patient continue as recommended.   ___ I have read the above report and request that my patient continue therapy with the following changes/special instructions:_________________________________________________________   ___ I have read the above report and request that my patient be discharged from therapy.      Physician Signature:        Date:       Time: